# Patient Record
Sex: MALE | Race: WHITE | NOT HISPANIC OR LATINO | URBAN - METROPOLITAN AREA
[De-identification: names, ages, dates, MRNs, and addresses within clinical notes are randomized per-mention and may not be internally consistent; named-entity substitution may affect disease eponyms.]

---

## 2023-01-11 PROBLEM — K60.2 ANAL FISSURE: Status: ACTIVE | Noted: 2023-01-11

## 2023-01-11 PROBLEM — E55.9 VITAMIN D DEFICIENCY: Status: ACTIVE | Noted: 2023-01-11

## 2023-01-11 PROBLEM — K62.5 RECTAL BLEEDING: Status: ACTIVE | Noted: 2023-01-11

## 2023-01-13 PROBLEM — K21.9 GASTROESOPHAGEAL REFLUX DISEASE: Status: ACTIVE | Noted: 2017-02-10

## 2023-01-13 PROBLEM — R32 INCONTINENCE: Status: ACTIVE | Noted: 2017-02-10

## 2023-01-13 NOTE — TELEPHONE ENCOUNTER
Message  Received: 2 days ago  Yusra Sung NP  [facility] Clinical Staff  Vit D quite low. High dose replacement has been ordered to pharmacy on file. Please have patient take as instructed and repeat vit D level 1 week after last dose (ordered). TSH slightly high, should repeat in 4 weeks (ordered). Potassium and LFTs also slightly high, will repeat with TSH. Avoid alcohol, acetaminophen. CBC normal. Thanks.

## 2023-02-03 NOTE — TELEPHONE ENCOUNTER
From: Rubina Aviles  To: Adriana Ayala MD  Sent: 2/3/2023 10:53 AM CST  Subject: LTD Paperwork for Aleida Espinosa have some LTD paperwork I need you all to fill out for me. I plan to bring it by the office today Friday around 1pm serge. . I just need it to be signed. Thank you.

## 2023-02-15 NOTE — TELEPHONE ENCOUNTER
Patient reports vitamin D screening was not covered. Patient is new to the office and established care and only seen on 12/16/22.

## 2023-02-15 NOTE — TELEPHONE ENCOUNTER
Pt has been taking medication for anxiety and depression, and thinks that it's making him constipated. Only has a movement every four days, and is still bleeding. Not sure if it's connected but had a nosebleed. Wanted to know if this is normal still or if he has to do something else.      Please advise  Best callback number is [phone]

## 2023-02-15 NOTE — TELEPHONE ENCOUNTER
Spoke with patient about constipation concerns. Advised to take Mirlax daily and if not improved to try Milk of Magnesia. Reviewed pre-op check list for up coming procedure. Voiced understanding.

## 2023-02-15 NOTE — TELEPHONE ENCOUNTER
From: Burgess Smith  To: Tiffany Amador NP  Sent: 2/15/2023 1:14 PM CST  Subject: Vitamin D    With the Vitamin D being Deficient is there something something in my chart that needs to be entered to show that it was a needed test due to previously having issues with being deficient before?  So, insurance will cover that test as a Preventative Maintenance test.

## 2023-02-27 NOTE — TELEPHONE ENCOUNTER
Authorization [id number] for [id number], [id number], [address] [place] [address] surgery 3/3/2023

## 2023-02-27 NOTE — TELEPHONE ENCOUNTER
Attempted to call the patient. Left a voicemail informing the patient he can call the office to further discuss any unanswered questions. He is okay to take his new medications at this time.      Meli Carlson PA-C

## 2023-02-27 NOTE — TELEPHONE ENCOUNTER
[facility] tenative Prior Auth for surgery 3/3  [id number]    [facility]

## 2023-02-28 NOTE — PATIENT INSTRUCTIONS
1 Food allergies  See above clinical history. Diarrhea with eggs within minutes. Headaches with chicken and lettuce in the past.  History of migraines. See above skin testing to screen for an IgE mediated process  Recommend to avoid any foods that are positive on skin testing from an IgE perspective  Handouts on food allergies versus food intolerances provided and reviewed  If skin testing is negative and still possible food intolerance would recommend to avoid based upon symptoms    #2 diarrhea  Worse with eggs. Followed by GI. Currently being evaluated for diarrhea with blood in the stool. Seen on skin testing to screen for food allergic triggers. See above #1  #3 allergic rhinitis  See above skin testing to screen for environmental allergens  Reviewed avoidance measures and potential treatment option of immunotherapy    Consider trial of Xyzal, levocetirizine 5 mg once a day in place of Claritin or Allegra if they are not providing symptomatic relief  Begin Flonase or Nasonex 2 sprays per nostril once a day if having prominent nasal congestion or postnasal drip    #4 migraines  Worse after eating chicken. Reviewed foods that can trigger migraines from a migraine perspective but not necessarily allergic to the food. Advised to be watchful for meats humaira cheese sulfites processed foods  Motrin as needed.   Follow-up with PCP or neurology as scheduled

## 2023-03-01 NOTE — TELEPHONE ENCOUNTER
Pt wants to know how the wound will be wrapped and dressed post-procedure. His neighbor will be helping him and he just wants to know what to expect.      Please advise  Best callback number is [phone]

## 2023-03-01 NOTE — TELEPHONE ENCOUNTER
Received fax from [address] [place] [address] received for patient use of Aimovig   Approval granted: 1/30/23-2/29/24        Pt notified

## 2023-03-03 PROBLEM — K64.8 BLEEDING INTERNAL HEMORRHOIDS: Status: ACTIVE | Noted: 2023-03-03

## 2023-03-04 NOTE — BRIEF OP NOTE
Pre-Operative Diagnosis: Anal fissure [K60.2]     Post-Operative Diagnosis: Anal fissure [K60.2]      Procedure Performed:   ANAL EXAMINATION UNDER ANESTHESIA, LATERAL INTERNAL SPHINCTEROTOMY, HEMORRHOID BANDING    Surgeon(s) and Role:     * Elina Hoover MD - Primary    Assistant(s):        Surgical Findings: anal fissure and internal hemorrhoids.       Specimen: none     Estimated Blood Loss: Blood Output: 5 mL (3/3/2023  9:05 PM)      Dictation Number:  [id number]    Juliet Bullock MD  3/3/2023  9:10 PM

## 2023-03-04 NOTE — OPERATIVE REPORT
[facility]    PATIENT'S NAME: Benny oCok   ATTENDING PHYSICIAN: Tahir Maravilla M.D. OPERATING PHYSICIAN: Tahir Maravilla M.D. PATIENT ACCOUNT#:   [de-identified]    [facility] 10  MEDICAL RECORD #:   [id number]       YOB: 1990  ADMISSION DATE:       03/03/2023      OPERATION DATE:  03/03/2023    OPERATIVE REPORT      PREOPERATIVE DIAGNOSIS:    1. Anal fissure. 2.   Internal hemorrhoids. POSTOPERATIVE DIAGNOSIS:    1. Anal fissure. 2.   Internal hemorrhoids. PROCEDURE:    1. Anorectal examination under anesthesia. 2.   Subcutaneous lateral internal sphincterotomy. 3.   Rubber band ligation of internal hemorrhoids in 3 locations. ASSISTANT:  OR staff. ANESTHESIA:  General endotracheal anesthesia. ANESTHESIOLOGIST:  Duke Swain MD.    BLOOD LOSS:  Less than 5 mL. COMPLICATIONS:  None apparent. SPECIMENS:  None. DISPOSITION:  The patient is awakened from anesthesia and brought to the recovery room in stable condition. He tolerated the procedure without apparent complication. Needle, sponge, and instrument counts were correct at the end of the procedure. Preprocedure antibiotic and DVT prophylaxis administered per protocol and a time-out was performed prior to initiating the procedure identifying the correct patient, procedure, and surgeon. INDICATIONS:  Please review the preprocedural History and Physical.  Briefly, the patient is a 76-year-old male with rectal bleeding and rectal pain. Physical examination is consistent with anal fissure and internal hemorrhoids. Risks, benefits, and alternatives of surgery were explained to the patient in detail. Please refer to the preprocedural History and Physical for further information. FINDINGS:  The patient has an anal fissure that has shown no significant improvement since my evaluation in the office.   Patient also has 3 prominent internal hemorrhoids in standard locations. No other lesions in the anorectal canal within view of the anal retractor. OPERATIVE TECHNIQUE:  The patient is brought to the operating room and after induction of general endotracheal anesthesia, he is placed in a prone jackknife position. Buttocks taped and spread in usual manner. The perineum is then prepped and draped in usual sterile fashion. Visual inspection of the perineum reveals no external lesions of the anorectum nor of the perineum itself. Digital rectal examination reveals a slightly elevated rectal tone, no masses, no blood, palpable internal hemorrhoids. The prostate is within normal limits for age. Rectal retractor is then placed. Circumferential inspection of the anorectal canal confirms internal hemorrhoids in the right posterolateral, right anterolateral, and left lateral locations. Posterior midline anal fissure is also confirmed. No other lesions noted. Attention is initially turned to subcutaneous lateral internal sphincterotomy. Rectal retractor is placed under tension, and the groove between the internal and external sphincter is identified with a gloved finger in the anal canal.  An 11 blade is then placed in the intersphincteric groove and turned toward the examining finger. The internal sphincter is then released and divided. Digital pressure is held until hemostasis confirmed. Local anesthetic is injected in the area. Next, rubber band ligation is achieved separately to each individual internal hemorrhoid using an O-ring ligator. The hemorrhoid is grasped and using the O-ring ligator, a rubber band is placed at the base of the internal hemorrhoid. Once all 3 areas are treated, retractors are withdrawn. Hemostasis is confirmed. In the left lateral aspect, bleeding is encountered from the rubber band site and this is oversewn using 3-0 Vicryl suture. Next, Gelfoam, lidocaine jelly is placed in the anorectal canal.  All instruments are withdrawn.   Needle, sponge, and instrument counts are correct. The patient is then awakened from anesthesia and brought to the recovery room in stable condition. He tolerated the procedure without apparent complication. Needle, sponge, and instrument counts are correct at the end of procedure and operative findings are discussed with the patient's family immediately upon conclusion of surgery.     Dictated By Lanette Parra M.D.  d: 03/03/2023 21:18:05  t: 03/04/2023 03:53:04  Job [id number]  /

## 2023-03-04 NOTE — ANESTHESIA PROCEDURE NOTES
Airway  Date/Time: 3/3/2023 8:09 PM  Urgency: elective      General Information and Staff    Patient location during procedure: OR  Anesthesiologist: Nisha Bowie MD  Performed: anesthesiologist   Performed by: Nisha Bowie MD  Authorized by: Nisha Bowie MD      Indications and Patient Condition  Indications for airway management: anesthesia  Sedation level: deep  Preoxygenated: yes  Patient position: sniffing  Mask difficulty assessment: 0 - not attempted    Final Airway Details  Final airway type: endotracheal airway      Successful airway: ETT  Cuffed: yes   Successful intubation technique: direct laryngoscopy  Endotracheal tube insertion site: oral  Blade: Tho  Blade size: #4  ETT size (mm): 7.5    Cormack-Lehane Classification: grade I - full view of glottis  Placement verified by: chest auscultation and capnometry   Measured from: lips  ETT to lips (cm): 23  Number of attempts at approach: 1

## 2023-03-04 NOTE — ANESTHESIA POSTPROCEDURE EVALUATION
[address] Patient Status:  Hospital Outpatient Surgery   Age/Gender 28year old male MRN [id number]   [facility] SURGERY Attending Corine Palacio, Timmy Medina MD   Hosp Day # 0 PCP Suzie Rodriguez MD       Anesthesia Post-op Note    ANAL EXAMINATION UNDER ANESTHESIA, LATERAL INTERNAL SPHINCTEROTOMY, HEMORRHOID BANDING    Procedure Summary     Date: 03/03/23 Room / Location: [facility] [facility] OR 11 / [address] [facility] OR    Anesthesia Start: 2000 Anesthesia Stop: 2121    Procedure: ANAL EXAMINATION UNDER ANESTHESIA, LATERAL INTERNAL SPHINCTEROTOMY, HEMORRHOID BANDING (Anus) Diagnosis:       Anal fissure      (Anal fissure [K60.2])    Surgeons: Jah Mina MD Anesthesiologist: Carla Conteh MD    Anesthesia Type: general ASA Status: 2          Anesthesia Type: No value filed. Vitals Value Taken Time   /78 03/03/23 2120   Temp 97.4 03/03/23 2122   Pulse 98 03/03/23 2122   Resp 15 03/03/23 2122   SpO2 96 % 03/03/23 2122   Vitals shown include unvalidated device data. Patient Location: PACU    Anesthesia Type: general    Airway Patency: patent and extubated    Postop Pain Control: adequate    Mental Status: mildly sedated but able to meaningfully participate in the post-anesthesia evaluation    Nausea/Vomiting: none    Cardiopulmonary/Hydration status: stable euvolemic    Complications: no apparent anesthesia related complications    Postop vital signs: stable    Dental Exam: Unchanged from Preop    Patient to be discharged from PACU when criteria met.

## 2023-03-06 NOTE — TELEPHONE ENCOUNTER
Pt is feeling hot and has sharp 9/10 pain in chest, chest is red as well. Unsure about bleeding and fever.  Feeling nauseous, pt is wondering if it's okay to take a peptid    Please advise  Best callback number is [phone]

## 2023-03-06 NOTE — TELEPHONE ENCOUNTER
S/w pt. He states he feels better now. He states he got sharp chest pain, nauseated and sweaty about 45min ago after a walk outside. He states s/s have all resolved after drinking a bubly drink and walking. Reviewed if he gets chest pain that is not resolved he needs to go to ER. Reviewed anesthesia and post op. Reviewed with pt to drink water, walk and ok to take pepcid if needed.       Pt verbalized understanding

## 2023-03-08 NOTE — TELEPHONE ENCOUNTER
Pt states he has blood in stool. Pain 3 or 4/10 denies fever.      Please advise  Call back #[phone]

## 2023-03-22 NOTE — TELEPHONE ENCOUNTER
PA requested for Ubrelvy  Clinical questions answered and submitted to insurance  Awaiting coverage determination

## 2023-03-24 NOTE — TELEPHONE ENCOUNTER
Received fax from [address] received for patient use of Nidhi Chaztee   Approval granted: 2/22/23-3/23/24        Pt notified

## 2023-03-24 NOTE — TELEPHONE ENCOUNTER
Medication: Ubrelvy 50 mg & aimovig 70 mg     Date of last refill:02/23/2023 with 11 addt refills & 02/23/2023

## 2023-08-02 NOTE — TELEPHONE ENCOUNTER
Received fax from [facility] received for patient use of Aimovig   Approval granted: 8/2/23-2/2/24        Pt notified

## 2023-08-02 NOTE — TELEPHONE ENCOUNTER
Per pharmacy, PA required as insurance changed    PA requested for Aimovig  Clinical questions answered and submitted to insurance  Awaiting coverage determination

## 2023-10-02 NOTE — TELEPHONE ENCOUNTER
Received fax from [facility] received for patient use of Veria Harm   Approval granted: 10/2/23-10/2/24        Pt notified

## 2024-01-04 NOTE — TELEPHONE ENCOUNTER
Received fax from [facility]   Approval received for patient use of Aimovig   Approval granted: 2/3/24-2/3/25        Pt notified

## 2024-01-04 NOTE — TELEPHONE ENCOUNTER
PA requested for Aimovig  Clinical questions answered and submitted to insurance  Awaiting coverage determination

## 2024-02-01 NOTE — PATIENT INSTRUCTIONS
Apply over the counter hydrocortisone 0.5% cream to facial redness twice daily for 5 days.     Please complete blood work as ordered. Do blood work fasting- no food or drink except for plain water- for 8 hours prior to testing. Ideally, labs would be done early in the morning. You can Google [employer] for locations and scheduling

## 2024-02-01 NOTE — TELEPHONE ENCOUNTER
Please enter lab orders for the patient's upcoming physical appointment.     Pt would also like to have his Vit D checked   Physical scheduled:   Your appointments       Date & Time Appointment Department ([place])    Feb 01, 2024  2:00 PM CST Adult Physical with Bolivar Ram NP [facility]    PLEASE NOTE - Most insurances allow a Complete Physical once every 366 days. Please schedule accordingly.    Please arrive 15 minutes prior to your scheduled appointment. Please also bring your Insurance card, Photo ID, and your medication bottles or a list of your current medication.    If you no longer require this appointment, please contact your physician office to cancel.              [facility] [address]  [address]  [facility] [id number] [phone]           Preferred lab: [facility]     The patient has been notified to complete fasting labs prior to their physical appointment.   .

## 2024-02-01 NOTE — PROGRESS NOTES
Wong Garcia is a 34 year old male who presents for a complete physical exam.     HPI:   Pt has no acute complaints. Does have possible exposure to STD, would like testing. Denies fever/chills, dysuria, frequency, urgency, hematuria, abd/flank pain, N/V/D. Denies penile discharge, genital rash or lesions.     Has prior history of vitamin D deficiency. Has not been on supplements lately.     Has prior history of anxiety and depression, follows with both psychiatry and therapist for this.     Has prior history of migraines, following with neurology for management.     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 Is  with no children. Does not watch diet. Does not exercise routinely with but walks a lot. Reports is non-smoker. Drinks 0-2 alcoholic drinks per month.     Colonoscopy:  N/A    Wt Readings from Last 6 Encounters:   02/01/24 159 lb 12.8 oz (72.5 kg)   05/24/23 155 lb (70.3 kg)   03/03/23 155 lb (70.3 kg)   02/28/23 150 lb (68 kg)   02/23/23 150 lb (68 kg)   12/16/22 155 lb (70.3 kg)       AST (U/L)   Date Value   01/10/2023 46 (H)     ALT (U/L)   Date Value   01/10/2023 67 (H)      Current Outpatient Medications   Medication Sig Dispense Refill    AIMOVIG 70 MG/ML Subcutaneous INJECT 1ML INTO THE SKIN ONCE A MONTH      escitalopram 10 MG Oral Tab Take 1 tablet (10 mg total) by mouth daily. 45 tablet 0    ALPRAZolam ER 0.5 MG Oral Tablet 24 Hr Take 1 tablet (0.5 mg total) by mouth every morning. 30 tablet 1    QUEtiapine 25 MG Oral Tab Take 1 tablet (25 mg total) by mouth nightly. 30 tablet 0    senna-docusate 8.6-50 MG Oral Tab Take 1 tablet by mouth daily. 30 tablet 0    aspirin-acetaminophen-caffeine 250-250-65 MG Oral Tab Take 1 tablet by mouth every 6 (six) hours as needed for Pain.      acetaminophen 500 MG Oral Tab Take 2 tablets (1,000 mg total) by mouth every 6 (six) hours as needed for Pain.      docusate sodium 100 MG Oral Tab Take 1 tablet (100 mg total) by mouth daily.       butalbital-acetaminophen-caffeine -40 MG Oral Tab Take 1 tablet by mouth every 4 (four) hours as needed.      propranolol 40 MG Oral Tab Take 1 tablet (40 mg total) by mouth as needed.        Past Medical History:   Diagnosis Date    COVID-19 01/2022    fever, sweating, chills, body aches, sore throat, headache, loss of taste and smell    COVID-19 04/2021    fever, chills, sore throat, loss of taste and smell    Hx of motion sickness     Migraines     Visual impairment     glasses      Past Surgical History:   Procedure Laterality Date    REMOVAL OF ANAL FISSURE  03/2022      Family History   Adopted: Yes   Problem Relation Age of Onset    Depression Mother     Anxiety Mother     Other (egg allergy) Maternal Grandfather       Social History     Socioeconomic History    Marital status:    Tobacco Use    Smoking status: Never     Passive exposure: Never    Smokeless tobacco: Never   Vaping Use    Vaping Use: Never used   Substance and Sexual Activity    Alcohol use: Not Currently    Drug use: Not Currently   Other Topics Concern    Caffeine Concern Yes     Comment: 1 can soda, 1 cup coffee daily    Exercise No    Seat Belt Yes      Social History: as above     Health Maintenance  Immunizations: Recommend flu vaccine for 0072-1155 flu season.     Immunization History   Administered Date(s) Administered    Covid-19 Vaccine Pfizer 30 mcg/0.3 ml 04/08/2021, 04/29/2021    TDAP 02/10/2017    Tb Intradermal Test 09/21/2017       REVIEW OF SYSTEMS:   GENERAL: feels well otherwise. No fever, chills, malaise.  SKIN: denies any unusual skin lesions, rash or pruritis.  EYES: denies blurred/double vision, light sensitivity or visual disturbances.  HEENT: denies nasal congestion, sinus pain/tenderness. Denies neck stiffness.  LUNGS: denies dyspnea, wheezing, SOB, PACHECO, cough.  CARDIOVASCULAR: denies chest pain on exertion, palpitations, edema, orthopnea.  GI: denies abdominal pain, heartburn, diarrhea or  constipation.  : denies dysuria, frequency, urgency, hematuria, changes in stream or nocturia.   MUSCULOSKELETAL: denies back pain.  NEURO: denies headaches, dizziness, syncope.    EXAM:   /60   Pulse 64   Temp 96.8 °F (36 °C)   Resp 16   Ht 5' 6.54\" (1.69 m)   Wt 159 lb 12.8 oz (72.5 kg)   SpO2 99%   BMI 25.38 kg/m²   Body mass index is 25.38 kg/m².   GENERAL: well developed, well nourished,in no apparent distress  SKIN: Skin warm/dry. Color appropriate for ethnicity. No rashes, suspicious lesions.  HEENT: atraumatic, normocephalic. Bilateral TM clear w/o erythema or bulge  EYES: PERRLA, EOMI. Conjunctiva are clear. Sclera white  NECK: supple w/o masses/tenderness/ adenopathy, no bruits/JVD, Thyroid symmetrical w/o enlargement  LUNGS: clear to auscultation bilaterally, effort normal. Symmetrical expansion during respirations.  CARDIO: RRR without murmurs. No S3/S4.   GI: Soft, non-tender/non-distended, BS(+)x4, no masses, HSM or CVA tenderness.  MUSCULOSKELETAL: muscle strength grade 5 to all extremities, back non-tender.   EXTREMITIES: no edema, full ROM to all extremities. Patellar DTR 2+ bilaterally   NEURO: A/Ox3, cranial nerves II-XII intact, motor/sensory function grossly intact.     ASSESSMENT AND PLAN:     HEALTH MAINTENANCE:     Encounter Diagnoses   Name Primary?    Routine physical examination- adult male in his usual state of health. Discussed importance of getting routine exercise for at least 30 minutes daily and making healthy diet choices. Screening labs ordered as below. Yes    Possible exposure to STD- check STD panel.      Vitamin D deficiency- check Vitamin D level.      Anxiety and depression- management per psych and mental health providers.      Migraine with aura and without status migrainosus, not intractable- management per neurology.         Orders Placed This Encounter   Procedures    CBC [6399] [Q]    COMP METABOLIC PANEL [07736] [Q]    LIPID PANEL [7600] [Q]    TSH W  REFLEX TO FREE T4 [56460][Q]    VITAMIN D, 25-HYDROXY [68808][Q]    T Pallidum Screening [place]    HIV AG AB Combo (Consent Obtained prechecked)    Hepatitis B Surface Antibody    Hepatitis B Surface Antigen    HCV Antibody    Urine Chlamydia/GC Amplification       Meds & Refills for this Visit:  Requested Prescriptions      No prescriptions requested or ordered in this encounter       Imaging & Consults:  None    No follow-ups on file.  Patient Instructions                         Apply over the counter hydrocortisone 0.5% cream to facial redness twice daily for 5 days.     Please complete blood work as ordered. Do blood work fasting- no food or drink except for plain water- for 8 hours prior to testing. Ideally, labs would be done early in the morning. You can Google [employer] for locations and scheduling

## 2024-02-14 DIAGNOSIS — G43.719 CHRONIC MIGRAINE WITHOUT AURA, INTRACTABLE, WITHOUT STATUS MIGRAINOSUS: ICD-10-CM

## 2024-02-15 RX ORDER — UBROGEPANT 50 MG/1
TABLET ORAL
Qty: 16 TABLET | Refills: 0 | Status: SHIPPED | OUTPATIENT
Start: 2024-02-15 | End: 2024-04-19

## 2024-02-15 NOTE — TELEPHONE ENCOUNTER
Medication: Ubrelvy 50 mg     Date of last refill: 02/23/2023 with 2 addt refills  [place]      Last office visit: 05/24/2023  Due back to clinic per last office note:    Date next office visit scheduled:    Future Appointments   Date Time Provider Department Center   2/21/2024  4:40 PM Richar Alfonso PA [facility]   3/5/2024  5:00 PM [facility]   3/12/2024  5:00 PM [facility]   3/19/2024  5:00 PM [facility]   3/26/2024  5:00 PM [facility]   4/2/2024  5:00 PM [facility]   4/9/2024  5:00 PM [facility]   4/16/2024  5:00 PM [facility]   4/23/2024  5:00 PM [facility]   4/30/2024  5:00 PM [facility]   5/7/2024  5:00 PM [facility]   5/14/2024  5:00 PM [facility]           Last OV note recommendation:       Impression/ Plan:  Wong Garcia is a 33 year old male with PMHx including but not limited to anxiety, depression and panic attacks, as well as chronic migraines, who originally presented 2/23/2023 for evaluation and management of migraines.       Neurologic exam is normal and non-focal.  Patient has history of migraines for many years but had not been on any preventive medication for over a year.      Of note, patient has been on Aimovig injections in the past and done well in the past with this medication for prevention. He has significant anxiety and concern raised from polypharmacy and serotonin syndrome with concurrent use of triptan medications;        He is now on Aimovig 70 mg injection monthly and doing well.    In addition, he is doing well on abortive therapy with ubrogepantt (Ubrelvy)  and will continue.     Otherwise, patient was counseled regarding conservative management, stress reduction techniques, moist heat, massage, and OTC anti-inflammatory medications as needed; also discussed optimal timing of migraine specific medications for abortive therapy to maximize effectiveness, and risk of medication overuse headache.     Patient was additionally advised to keep a headache diary, detailing migraine triggers, alleviating factors, response to medication, as well as frequency/severity and type of headaches.      (G43.779) Chronic migraine without aura, intractable, without status migrainosus  (primary encounter diagnosis)  Plan: as noted above      (F41.1) MELODY (generalized anxiety disorder)  Plan: as noted above      (F32.A) Depression, unspecified depression type  Plan: as noted above     No follow-ups on file.        Price Curtis MD, Neurology  [facility]  Pager [phone]  5/24/2023

## 2024-02-20 NOTE — TELEPHONE ENCOUNTER
----- Message from Bolivar Ram NP sent at 2/19/2024 12:52 PM CST -----  TSH very high, should start medications for this, needs appointment to discussed, video would be OK. Vitamin D also low, can discuss replacement at visit as noted. Other labs good, no concerning or significant abnormalities. STD screening negative. Thanks.

## 2024-02-20 NOTE — TELEPHONE ENCOUNTER
Thyroid hormone is very high, and he should start medications for this, needs appointment to discussed, video OK

## 2024-02-28 NOTE — PROGRESS NOTES
Chief Complaint   Patient presents with    Lab Results     Review        HPI:  Presents for follow up of recent labs with significantly elevated TSH (17.19) and vitamin D deficiency (18). Is not currently on any thyroid medications. Denies taking vitamin B or biotin supplements. Reports is not on any vitamin D supplements either. Denies unintended weight changes, heat/cold intolerance, constipation.     Reports has long time issues with recurrent strep throat infections, requiring frequent antibiotic management, most recently on 2/8/24. Wondering if tonsillectomy is option for him.  Denies current sore throat, fever/chills.     Past Medical History:   Diagnosis Date    COVID-19 01/2022    fever, sweating, chills, body aches, sore throat, headache, loss of taste and smell    COVID-19 04/2021    fever, chills, sore throat, loss of taste and smell    Hx of motion sickness     Migraines     Visual impairment     glasses       Patient Active Problem List   Diagnosis    MELODY (generalized anxiety disorder)    Depression    Attention deficit hyperactivity disorder (ADHD), predominantly inattentive type    Anal fissure    Rectal bleeding    Vitamin D deficiency    Incontinence    Gastroesophageal reflux disease    Major depressive disorder, recurrent episode, moderate (HCC)    Bleeding internal hemorrhoids       Current Outpatient Medications   Medication Sig Dispense Refill    levothyroxine 25 MCG Oral Tab Take 1 tablet (25 mcg total) by mouth before breakfast. 90 tablet 0    ergocalciferol 1.25 MG (22115 UT) Oral Cap Take 1 capsule (50,000 Units total) by mouth once a week. 12 capsule 0    escitalopram 10 MG Oral Tab Take 1 tablet (10 mg total) by mouth daily. 90 tablet 0    ubrogepant (UBRELVY) 50 MG Oral Tab TAKE 1 TABLET BY MOUTH AT ONSET OF MIGRAINE. MAY TAKE ADDITIONAL TABLET IN 2 HOURS AS NEEDED. DO NOT EXCEED 2 TABLETS PER 24 HOUR PERIOD 16 tablet 0    AIMOVIG 70 MG/ML Subcutaneous INJECT 1ML INTO THE SKIN ONCE A  MONTH      ALPRAZolam ER 0.5 MG Oral Tablet 24 Hr Take 1 tablet (0.5 mg total) by mouth every morning. 30 tablet 1    QUEtiapine 25 MG Oral Tab Take 1 tablet (25 mg total) by mouth nightly. 30 tablet 0    senna-docusate 8.6-50 MG Oral Tab Take 1 tablet by mouth daily. 30 tablet 0    aspirin-acetaminophen-caffeine 250-250-65 MG Oral Tab Take 1 tablet by mouth every 6 (six) hours as needed for Pain.      acetaminophen 500 MG Oral Tab Take 2 tablets (1,000 mg total) by mouth every 6 (six) hours as needed for Pain.      docusate sodium 100 MG Oral Tab Take 1 tablet (100 mg total) by mouth daily.      butalbital-acetaminophen-caffeine -40 MG Oral Tab Take 1 tablet by mouth every 4 (four) hours as needed.      propranolol 40 MG Oral Tab Take 1 tablet (40 mg total) by mouth as needed.         Physical Exam  /62   Pulse 72   Temp 98.4 °F (36.9 °C)   Resp 18   Ht 5' 6.54\" (1.69 m)   Wt 160 lb 3.2 oz (72.7 kg)   SpO2 99%   BMI 25.44 kg/m²   Constitutional: well developed, well nourished, in no apparent distress  HEENT: Normocephalic and atraumatic. T  Eyes: Conjunctivae are pink and moist without exudate or drainage. EOMI.  Neck: Normal range of motion. Neck supple, thyroid not appreciably enlarged.   Lymphadenopathy: No cervical adenopathy.   Cardiovascular: Normal rate.   Pulmonary/Chest: No respiratory distress. Effort normal.  Skin: Skin is warm and dry. No rash noted. No erythema. No pallor.     A/P:    Encounter Diagnoses   Name Primary?    Elevated TSH- levothyroxine 25mcg. Medication administration, use and side effects discussed. Repeat TSH in 12 weeks with vitamin D as ordered. Will manage dose dependent on results.    Yes    Vitamin D deficiency- high dose vitamin D ordered. Medication administration and use discussed. Repeat vit D level in 12 weeks.        Recurrent streptococcal tonsillitis- referred to Dr. Velaqsuez for eval and discussion of possible tonsillectomy.       Total visit time was  31 minutes, including 26 minutes of face to face visit time and 5 minutes of documentation and chart review.       Orders Placed This Encounter   Procedures    VITAMIN D, 25-HYDROXY [56802][Q]    TSH W REFLEX TO FREE T4 [15420][Q]       Meds & Refills for this Visit:  Requested Prescriptions     Signed Prescriptions Disp Refills    levothyroxine 25 MCG Oral Tab 90 tablet 0     Sig: Take 1 tablet (25 mcg total) by mouth before breakfast.    ergocalciferol 1.25 MG (85065 UT) Oral Cap 12 capsule 0     Sig: Take 1 capsule (50,000 Units total) by mouth once a week.       Imaging & Consults:  ENT - INTERNAL    No follow-ups on file.  Patient Instructions                 Take levothyroxine, one tab, first thing in the morning on an empty stomach. Do not eat or drink, except for plain water, for 30-60 minutes after taking this medication. Do not take other medications for 30 minutes after taking this medication.     Take vitamin D high dose 50,000 units, once weekly for 3 months. After completing high dose vitamin D, start to take over the counter vitamin D3 supplement 1000 units daily, to maintain levels.     All questions were answered and the patient understands the plan.

## 2024-02-28 NOTE — PATIENT INSTRUCTIONS
Take levothyroxine, one tab, first thing in the morning on an empty stomach. Do not eat or drink, except for plain water, for 30-60 minutes after taking this medication. Do not take other medications for 30 minutes after taking this medication.     Take vitamin D high dose 50,000 units, once weekly for 3 months. After completing high dose vitamin D, start to take over the counter vitamin D3 supplement 1000 units daily, to maintain levels.   
normal/extra large/pink

## 2024-04-02 ENCOUNTER — APPOINTMENT (OUTPATIENT)
Dept: URBAN - METROPOLITAN AREA CLINIC 248 | Age: 34
Setting detail: DERMATOLOGY
End: 2024-04-02

## 2024-04-02 DIAGNOSIS — L21.8 OTHER SEBORRHEIC DERMATITIS: ICD-10-CM

## 2024-04-02 PROCEDURE — OTHER PRESCRIPTION: OTHER

## 2024-04-02 PROCEDURE — OTHER COUNSELING: OTHER

## 2024-04-02 PROCEDURE — OTHER PRESCRIPTION MEDICATION MANAGEMENT: OTHER

## 2024-04-02 PROCEDURE — 99203 OFFICE O/P NEW LOW 30 MIN: CPT

## 2024-04-02 RX ORDER — DESONIDE 0.5 MG/G
CREAM TOPICAL BID
Qty: 60 | Refills: 2 | Status: ERX | COMMUNITY
Start: 2024-04-02

## 2024-04-02 RX ORDER — KETOCONAZOLE 20 MG/ML
SHAMPOO, SUSPENSION TOPICAL
Qty: 120 | Refills: 3 | Status: ERX | COMMUNITY
Start: 2024-04-02

## 2024-04-02 ASSESSMENT — LOCATION SIMPLE DESCRIPTION DERM
LOCATION SIMPLE: RIGHT NOSE
LOCATION SIMPLE: POSTERIOR SCALP

## 2024-04-02 ASSESSMENT — LOCATION DETAILED DESCRIPTION DERM
LOCATION DETAILED: POSTERIOR MID-PARIETAL SCALP
LOCATION DETAILED: RIGHT NASAL SIDEWALL

## 2024-04-02 ASSESSMENT — LOCATION ZONE DERM
LOCATION ZONE: NOSE
LOCATION ZONE: SCALP

## 2024-04-02 NOTE — PROCEDURE: PRESCRIPTION MEDICATION MANAGEMENT
Render In Strict Bullet Format?: No
Detail Level: Zone
Initiate Treatment: ketoconazole 2 % shampoo \\nQuantity: 120.0 ml\\nSig: Wash scalp once per week, wash face daily\\n\\ndesonide 0.05 % topical cream BID\\nQuantity: 60.0 g\\nSig: Apply to aa on face QD M-F

## 2024-04-02 NOTE — HPI: SKIN LESION
What Type Of Note Output Would You Prefer (Optional)?: Bullet Format
How Severe Is Your Skin Lesion?: moderate
Has Your Skin Lesion Been Treated?: not been treated
Is This A New Presentation, Or A Follow-Up?: Skin Lesion
Breath sounds clear and equal bilaterally.

## 2024-04-18 DIAGNOSIS — G43.719 CHRONIC MIGRAINE WITHOUT AURA, INTRACTABLE, WITHOUT STATUS MIGRAINOSUS: ICD-10-CM

## 2024-04-19 RX ORDER — UBROGEPANT 50 MG/1
TABLET ORAL
Qty: 16 TABLET | Refills: 0 | Status: SHIPPED | OUTPATIENT
Start: 2024-04-19

## 2024-04-19 NOTE — TELEPHONE ENCOUNTER
Medication: ubrogepant (UBRELVY) 50 MG      Date of last refill: 2/15/24 (#16/0R)  [place]     Last office visit: 5/24/23  Due back to clinic per last office note:  no mention  Date next office visit scheduled:    Future Appointments   Date Time Provider Department Center   4/23/2024  5:00 PM [facility]   4/30/2024  5:00 PM [facility]   5/7/2024  5:00 PM [facility]   5/14/2024  5:00 PM [facility]   5/28/2024  6:40 PM Richar Alfonso PA [facility]           Last OV note recommendation:    Impression/ Plan:  Wong Garcia is a 33 year old male with PMHx including but not limited to anxiety, depression and panic attacks, as well as chronic migraines, who originally presented 2/23/2023 for evaluation and management of migraines.       Neurologic exam is normal and non-focal.  Patient has history of migraines for many years but had not been on any preventive medication for over a year.      Of note, patient has been on Aimovig injections in the past and done well in the past with this medication for prevention. He has significant anxiety and concern raised from polypharmacy and serotonin syndrome with concurrent use of triptan medications;        He is now on Aimovig 70 mg injection monthly and doing well.    In addition, he is doing well on abortive therapy with ubrogepantt (Ubrelvy) and will continue.     Otherwise, patient was counseled regarding conservative management, stress reduction techniques, moist heat, massage, and OTC anti-inflammatory medications as needed; also discussed optimal timing of migraine specific medications for abortive therapy to maximize effectiveness, and risk of medication overuse headache.     Patient was additionally advised to keep a headache diary, detailing migraine triggers, alleviating factors,  response to medication, as well as frequency/severity and type of headaches.      (G43.039) Chronic migraine without aura, intractable, without status migrainosus  (primary encounter diagnosis)  Plan: as noted above      (F41.1) MELODY (generalized anxiety disorder)  Plan: as noted above      (F32.A) Depression, unspecified depression type  Plan: as noted above

## 2024-05-02 ENCOUNTER — RX ONLY (RX ONLY)
Age: 34
End: 2024-05-02

## 2024-05-02 ENCOUNTER — APPOINTMENT (OUTPATIENT)
Dept: URBAN - METROPOLITAN AREA CLINIC 248 | Age: 34
Setting detail: DERMATOLOGY
End: 2024-05-03

## 2024-05-02 DIAGNOSIS — D49.2 NEOPLASM OF UNSPECIFIED BEHAVIOR OF BONE, SOFT TISSUE, AND SKIN: ICD-10-CM

## 2024-05-02 DIAGNOSIS — L82.1 OTHER SEBORRHEIC KERATOSIS: ICD-10-CM

## 2024-05-02 DIAGNOSIS — D18.0 HEMANGIOMA: ICD-10-CM

## 2024-05-02 DIAGNOSIS — D22 MELANOCYTIC NEVI: ICD-10-CM

## 2024-05-02 DIAGNOSIS — L81.4 OTHER MELANIN HYPERPIGMENTATION: ICD-10-CM

## 2024-05-02 DIAGNOSIS — L21.8 OTHER SEBORRHEIC DERMATITIS: ICD-10-CM

## 2024-05-02 PROBLEM — D22.5 MELANOCYTIC NEVI OF TRUNK: Status: ACTIVE | Noted: 2024-05-02

## 2024-05-02 PROBLEM — D18.01 HEMANGIOMA OF SKIN AND SUBCUTANEOUS TISSUE: Status: ACTIVE | Noted: 2024-05-02

## 2024-05-02 PROCEDURE — OTHER SHAVE REMOVAL: OTHER

## 2024-05-02 PROCEDURE — 11301 SHAVE SKIN LESION 0.6-1.0 CM: CPT

## 2024-05-02 PROCEDURE — OTHER COUNSELING: OTHER

## 2024-05-02 PROCEDURE — 99213 OFFICE O/P EST LOW 20 MIN: CPT | Mod: 25

## 2024-05-02 PROCEDURE — OTHER PRESCRIPTION: OTHER

## 2024-05-02 PROCEDURE — OTHER PRESCRIPTION MEDICATION MANAGEMENT: OTHER

## 2024-05-02 RX ORDER — ROFLUMILAST 3 MG/G
AEROSOL, FOAM TOPICAL
Qty: 60 | Refills: 1 | Status: CANCELLED | COMMUNITY
Start: 2024-05-02

## 2024-05-02 RX ORDER — ROFLUMILAST 3 MG/G
AEROSOL, FOAM TOPICAL
Qty: 60 | Refills: 1 | Status: ERX

## 2024-05-02 RX ORDER — FLUCONAZOLE 150 MG/1
TABLET ORAL
Qty: 6 | Refills: 0 | Status: ERX | COMMUNITY
Start: 2024-05-02

## 2024-05-02 ASSESSMENT — LOCATION DETAILED DESCRIPTION DERM
LOCATION DETAILED: PERIUMBILICAL SKIN
LOCATION DETAILED: RIGHT INFERIOR LATERAL UPPER BACK
LOCATION DETAILED: POSTERIOR MID-PARIETAL SCALP
LOCATION DETAILED: RIGHT RIB CAGE
LOCATION DETAILED: RIGHT NASAL SIDEWALL

## 2024-05-02 ASSESSMENT — LOCATION SIMPLE DESCRIPTION DERM
LOCATION SIMPLE: RIGHT UPPER BACK
LOCATION SIMPLE: ABDOMEN
LOCATION SIMPLE: RIGHT NOSE
LOCATION SIMPLE: POSTERIOR SCALP

## 2024-05-02 ASSESSMENT — LOCATION ZONE DERM
LOCATION ZONE: SCALP
LOCATION ZONE: NOSE
LOCATION ZONE: TRUNK

## 2024-05-02 NOTE — PROCEDURE: PRESCRIPTION MEDICATION MANAGEMENT
Discontinue Regimen: ketoconazole 2 % shampoo \\nQuantity: 120.0 ml\\nSig: Wash scalp once per week, wash face daily
Continue Regimen: desonide 0.05 % topical cream BID\\nQuantity: 60.0 g\\nSig: Apply to aa on face QD M-F.
Render In Strict Bullet Format?: No
Detail Level: Zone
Initiate Treatment: fluconazole 150 mg tablet \\nQuantity: 6.0 Tablet  Days Supply: 42\\nSig: Take one tablet every week for 6 weeks\\n\\nZoryve 0.3 % topical foam \\nQuantity: 60.0 g  Days Supply: 30\\nSig: Apply to AA 1-2x a day on face

## 2024-05-02 NOTE — PROCEDURE: SHAVE REMOVAL
Medical Necessity Information: It is in your best interest to select a reason for this procedure from the list below. All of these items fulfill various CMS LCD requirements except the new and changing color options.
Medical Necessity Clause: This procedure was medically necessary because the lesion that was treated was:
Lab: -[id number]
Lab Facility: 0
Detail Level: Detailed
Was A Bandage Applied: Yes
Size Of Lesion In Cm (Required): 0.7
Depth Of Shave: dermis
Biopsy Method: Dermablade
Anesthesia Type: 1% lidocaine with epinephrine
Hemostasis: Drysol
No
Wound Care: Petrolatum
Path Notes (To The Dermatopathologist): Check margins please
Render Path Notes In Note?: No
Consent was obtained from the patient. The risks and benefits to therapy were discussed in detail. Specifically, the risks of infection, scarring, bleeding, prolonged wound healing, incomplete removal, allergy to anesthesia, nerve injury and recurrence were addressed. Prior to the procedure, the treatment site was clearly identified and confirmed by the patient. All components of Universal Protocol/PAUSE Rule completed.
Post-Care Instructions: I reviewed with the patient in detail post-care instructions. Patient is to keep the biopsy site dry overnight, and then apply bacitracin twice daily until healed. Patient may apply hydrogen peroxide soaks to remove any crusting.
Notification Instructions: Patient will be notified of pathology results. However, patient instructed to call the office if not contacted within 2 weeks.
Billing Type: Third-Party Bill

## 2024-05-16 RX ORDER — ERGOCALCIFEROL 1.25 MG/1
50000 CAPSULE ORAL WEEKLY
Qty: 12 CAPSULE | Refills: 0 | OUTPATIENT
Start: 2024-05-16

## 2024-05-16 NOTE — TELEPHONE ENCOUNTER
Refill request for:    Requested Prescriptions     Pending Prescriptions Disp Refills    ERGOCALCIFEROL 1.25 MG (34199 UT) Oral Cap [Pharmacy Med Name: VITAMIN D2 50,000IU (ERGO) CAP RX] 12 capsule 0     Sig: TAKE 1 CAPSULE BY MOUTH ONCE A WEEK        Last Prescribed Quantity Refills   2/28/24 12 0     LOV 2/28/2024     Patient was asked to follow-up in: Follow-up not documented in note    Appointment scheduled: Visit date not found    Medication not on protocol.     # 12 with 0 refills routed to LUPILLO Obrien for review

## 2024-05-24 RX ORDER — LEVOTHYROXINE SODIUM 0.03 MG/1
25 TABLET ORAL
Qty: 90 TABLET | Refills: 0 | Status: SHIPPED | OUTPATIENT
Start: 2024-05-24

## 2024-05-24 NOTE — TELEPHONE ENCOUNTER
Requested Prescriptions     Pending Prescriptions Disp Refills    LEVOTHYROXINE 25 MCG Oral Tab [Pharmacy Med Name: LEVOTHYROXINE 0.025MG (25MCG) TAB] 90 tablet 0     Sig: TAKE 1 TABLET BY MOUTH EVERY DAY BEFORE BREAKFAST     LOV 2/28/2024     Patient was asked to follow-up in: Follow-up not documented in note    Appointment scheduled: Visit date not found     Medication refilled per protocol.

## 2024-07-03 DIAGNOSIS — R79.89 ELEVATED TSH: Primary | ICD-10-CM

## 2024-07-03 RX ORDER — LEVOTHYROXINE SODIUM 0.05 MG/1
50 TABLET ORAL
Qty: 90 TABLET | Refills: 0 | Status: SHIPPED | OUTPATIENT
Start: 2024-07-03

## 2024-08-15 ENCOUNTER — OFFICE VISIT (OUTPATIENT)
Facility: LOCATION | Age: 34
End: 2024-08-15
Payer: COMMERCIAL

## 2024-08-15 VITALS — OXYGEN SATURATION: 99 % | TEMPERATURE: 97 F | HEART RATE: 74 BPM

## 2024-08-15 DIAGNOSIS — K62.5 RECTAL BLEEDING: Primary | ICD-10-CM

## 2024-08-23 NOTE — PROGRESS NOTES
Follow Up Visit Note       Active Problems      1. Rectal bleeding          Chief Complaint   Chief Complaint   Patient presents with    [facility]      EP-I have a sore/tear and currently bleeding a good bit           History of Present Illness    Patient presents at the request of his primary care physician after recent evaluation in the emergency room.  Patient said prior surgical intervention for sphincterotomy from which she has recovered nicely.  Recently the patient experienced episode of bleeding and discomfort from the rectum and now he wishes follow-up evaluation.  No further episodes reported.    The patient denies fever, chills, chest pain, shortness of breath, dyspnea. The patient also denies hematemesis, melena, or hematochezia. The patient denies change in bowel or bladder habits. There is no complaint of hematuria or dysuria.    Allergies  Wong is allergic to eggs or egg-derived products.    Past Medical / Surgical / Social / Family History    The past medical and past surgical history have been reviewed by me today.    Past Medical History:    Anxiety    COVID-19    fever, sweating, chills, body aches, sore throat, headache, loss of taste and smell    COVID-19    fever, chills, sore throat, loss of taste and smell    Depression    Hx of motion sickness    Migraines    Visual impairment    glasses     Past Surgical History:   Procedure Laterality Date    Removal of anal fissure  03/2022       The family history and social history have been reviewed by me today.    Family History   Adopted: Yes   Problem Relation Age of Onset    Depression Mother     Anxiety Mother     Other (egg allergy) Maternal Grandfather      Social History     Socioeconomic History    Marital status:    Tobacco Use    Smoking status: Never     Passive exposure: Never    Smokeless tobacco: Never   Vaping Use    Vaping status: Never Used   Substance and Sexual Activity    Alcohol use: Not Currently    Drug use: Not  Currently   Other Topics Concern    Caffeine Concern Yes     Comment: 1 can soda, 1 cup coffee daily    Exercise No    Seat Belt Yes        Current Outpatient Medications:     ubrogepant (UBRELVY) 50 MG Oral Tab, TAKE 1 TABLET BY MOUTH AT ONSET OF MIGRAINE. MAY TAKE ADDITIONAL TABLET IN 2 HOURS AS NEEDED. DO NOT EXCEED 2 TABLETS PER 24 HOUR PERIOD, Disp: 16 tablet, Rfl: 0    ALPRAZOLAM ER 0.5 MG Oral Tablet 24 Hr, TAKE 1 TABLET(0.5 MG) BY MOUTH EVERY MORNING, Disp: 30 tablet, Rfl: 0    AIMOVIG 70 MG/ML Subcutaneous, INJECT 1ML INTO THE SKIN ONCE A MONTH, Disp: 1 mL, Rfl: 0    senna-docusate 8.6-50 MG Oral Tab, Take 1 tablet by mouth daily., Disp: 30 tablet, Rfl: 0    aspirin-acetaminophen-caffeine 250-250-65 MG Oral Tab, Take 1 tablet by mouth every 6 (six) hours as needed for Pain., Disp: , Rfl:     docusate sodium 100 MG Oral Tab, Take 1 tablet (100 mg total) by mouth daily., Disp: , Rfl:     butalbital-acetaminophen-caffeine -40 MG Oral Tab, Take 1 tablet by mouth every 4 (four) hours as needed., Disp: , Rfl:     escitalopram 10 MG Oral Tab, Take 1 tablet (10 mg total) by mouth daily., Disp: 90 tablet, Rfl: 0    QUEtiapine 25 MG Oral Tab, Take 1 tablet (25 mg total) by mouth nightly., Disp: 90 tablet, Rfl: 0    levothyroxine 50 MCG Oral Tab, Take 1 tablet (50 mcg total) by mouth before breakfast., Disp: 90 tablet, Rfl: 0    acetaminophen 500 MG Oral Tab, Take 2 tablets (1,000 mg total) by mouth every 6 (six) hours as needed for Pain., Disp: , Rfl:      Review of Systems  The Review of Systems has been reviewed by me during today.  Review of Systems     Physical Findings   Pulse 74   Temp 96.8 °F (36 °C)   SpO2 99%   Physical Exam  Constitutional:       Appearance: He is well-developed.   HENT:      Head: Normocephalic and atraumatic.   Eyes:      General: No scleral icterus.  Neck:      Trachea: No tracheal deviation.   Genitourinary:     Prostate: Not enlarged and not tender.      Rectum: No mass,  tenderness, anal fissure, external hemorrhoid or internal hemorrhoid. Normal anal tone.          Comments: No Prostate Nodule  Anal Sphincter Intact  No Pruritis Ani  No Lichenification  No Abscess  No Fistula in ano  No Anterior Fissure  No Posterior Fissure    See Procedures:  Anoscopy    Skin:     General: Skin is warm and dry.   Neurological:      Mental Status: He is alert and oriented to person, place, and time.   Psychiatric:         Speech: Speech normal.         Behavior: Behavior normal. Behavior is cooperative.         Thought Content: Thought content normal.         Judgment: Judgment normal.          Assessment   1. Rectal bleeding        Plan     The area of concern on the perianal region has resolved.  There is no active bleeding, infection, fluid collection, hematoma, or seroma now.  Dietary, activity, exercise and lifestyle recommendations were discussed.  The patient will follow-up with me on an as-needed basis.  The patient was provided ample opportunity to ask questions.  All of the patient's questions were answered in detail.  The patient voiced understanding of the care plan.     No orders of the defined types were placed in this encounter.      Imaging & Referrals   None    Follow Up  No follow-ups on file.    Glenroy Hoover MD

## 2024-09-17 ENCOUNTER — HOSPITAL ENCOUNTER (EMERGENCY)
Age: 34
Discharge: HOME | End: 2024-09-17
Attending: EMERGENCY MEDICINE
Payer: COMMERCIAL

## 2024-09-17 ENCOUNTER — OFFICE VISIT (OUTPATIENT)
Dept: NEUROLOGY | Facility: CLINIC | Age: 34
End: 2024-09-17
Payer: COMMERCIAL

## 2024-09-17 ENCOUNTER — TELEPHONE (OUTPATIENT)
Dept: FAMILY MEDICINE CLINIC | Facility: CLINIC | Age: 34
End: 2024-09-17

## 2024-09-17 ENCOUNTER — APPOINTMENT (OUTPATIENT)
Dept: ULTRASOUND IMAGING | Age: 34
End: 2024-09-17
Attending: PHYSICIAN ASSISTANT
Payer: COMMERCIAL

## 2024-09-17 ENCOUNTER — HOSPITAL ENCOUNTER (OUTPATIENT)
Age: 34
Discharge: OTHER INSTITUTION | End: 2024-09-17
Payer: COMMERCIAL

## 2024-09-17 VITALS
OXYGEN SATURATION: 97 % | DIASTOLIC BLOOD PRESSURE: 60 MMHG | TEMPERATURE: 97 F | HEART RATE: 68 BPM | SYSTOLIC BLOOD PRESSURE: 112 MMHG | RESPIRATION RATE: 20 BRPM

## 2024-09-17 VITALS
HEART RATE: 97 BPM | HEIGHT: 67 IN | TEMPERATURE: 98 F | RESPIRATION RATE: 16 BRPM | WEIGHT: 160 LBS | BODY MASS INDEX: 25.11 KG/M2 | SYSTOLIC BLOOD PRESSURE: 126 MMHG | DIASTOLIC BLOOD PRESSURE: 93 MMHG | OXYGEN SATURATION: 97 %

## 2024-09-17 VITALS
DIASTOLIC BLOOD PRESSURE: 71 MMHG | HEIGHT: 66 IN | HEART RATE: 73 BPM | SYSTOLIC BLOOD PRESSURE: 111 MMHG | WEIGHT: 160 LBS | BODY MASS INDEX: 25.71 KG/M2 | RESPIRATION RATE: 16 BRPM

## 2024-09-17 DIAGNOSIS — L03.116 LEFT LEG CELLULITIS: Primary | ICD-10-CM

## 2024-09-17 DIAGNOSIS — G43.909 EPISODIC MIGRAINE: Primary | ICD-10-CM

## 2024-09-17 DIAGNOSIS — M79.89 LEFT LEG SWELLING: Primary | ICD-10-CM

## 2024-09-17 DIAGNOSIS — G43.719 CHRONIC MIGRAINE WITHOUT AURA, INTRACTABLE, WITHOUT STATUS MIGRAINOSUS: ICD-10-CM

## 2024-09-17 PROCEDURE — 3008F BODY MASS INDEX DOCD: CPT | Performed by: OTHER

## 2024-09-17 PROCEDURE — 3074F SYST BP LT 130 MM HG: CPT | Performed by: OTHER

## 2024-09-17 PROCEDURE — 99205 OFFICE O/P NEW HI 60 MIN: CPT | Performed by: NURSE PRACTITIONER

## 2024-09-17 PROCEDURE — 99284 EMERGENCY DEPT VISIT MOD MDM: CPT

## 2024-09-17 PROCEDURE — 99213 OFFICE O/P EST LOW 20 MIN: CPT | Performed by: OTHER

## 2024-09-17 PROCEDURE — 3074F SYST BP LT 130 MM HG: CPT | Performed by: NURSE PRACTITIONER

## 2024-09-17 PROCEDURE — 3078F DIAST BP <80 MM HG: CPT | Performed by: OTHER

## 2024-09-17 PROCEDURE — 93971 EXTREMITY STUDY: CPT | Performed by: PHYSICIAN ASSISTANT

## 2024-09-17 PROCEDURE — 3078F DIAST BP <80 MM HG: CPT | Performed by: NURSE PRACTITIONER

## 2024-09-17 RX ORDER — ERENUMAB-AOOE 70 MG/ML
70 INJECTION SUBCUTANEOUS
Qty: 1 ML | Refills: 11 | Status: SHIPPED | OUTPATIENT
Start: 2024-09-17

## 2024-09-17 RX ORDER — UBROGEPANT 50 MG/1
TABLET ORAL
Qty: 16 TABLET | Refills: 5 | Status: SHIPPED | OUTPATIENT
Start: 2024-09-17

## 2024-09-17 RX ORDER — CEPHALEXIN 500 MG/1
500 CAPSULE ORAL 4 TIMES DAILY
Qty: 28 CAPSULE | Refills: 0 | Status: SHIPPED | OUTPATIENT
Start: 2024-09-17 | End: 2024-09-24

## 2024-09-17 RX ORDER — CEPHALEXIN 500 MG/1
500 CAPSULE ORAL ONCE
Status: COMPLETED | OUTPATIENT
Start: 2024-09-17 | End: 2024-09-17

## 2024-09-17 NOTE — PATIENT INSTRUCTIONS
Refill policies:    Allow 2-3 business days for refills; controlled substances may take longer.  Contact your pharmacy at least 5 days prior to running out of medication and have them send an electronic request or submit request through the “request refill” option in your [employer] account.  Refills are not addressed on weekends; covering physicians do not authorize routine medications on weekends.  No narcotics or controlled substances are refilled after noon on Fridays or by on call physicians.  By law, narcotics must be electronically prescribed.  A 30 day supply with no refills is the maximum allowed.  If your prescription is due for a refill, you may be due for a follow up appointment.  To best provide you care, patients receiving routine medications need to be seen at least once a year.  Patients receiving narcotic/controlled substance medications need to be seen at least once every 3 months.  In the event that your preferred pharmacy does not have the requested medication in stock (e.g. Backordered), it is your responsibility to find another pharmacy that has the requested medication available.  We will gladly send a new prescription to that pharmacy at your request.    Scheduling Tests:    If your physician has ordered radiology tests such as MRI or CT scans, please contact Central Scheduling at [phone] right away to schedule the test.  Once scheduled, the [facility] Centralized Referral Team will work with your insurance carrier to obtain pre-certification or prior authorization.  Depending on your insurance carrier, approval may take 3-10 days.  It is highly recommended patients assure they have received an authorization before having a test performed.  If test is done without insurance authorization, patient may be responsible for the entire amount billed.      Precertification and Prior Authorizations:  If your physician has recommended that you have a procedure or additional testing performed the [facility]  Centralized Referral Team will contact your insurance carrier to obtain pre-certification or prior authorization.    You are strongly encouraged to contact your insurance carrier to verify that your procedure/test has been approved and is a COVERED benefit.  Although the [facility] Centralized Referral Team does its due diligence, the insurance carrier gives the disclaimer that \"Although the procedure is authorized, this does not guarantee payment.\"    Ultimately the patient is responsible for payment.   Thank you for your understanding in this matter.  Paperwork Completion:  If you require FMLA or disability paperwork for your recovery, please make sure to either drop it off or have it faxed to our office at [phone]. Be sure the form has your name and date of birth on it.  The form will be faxed to our Forms Department and they will complete it for you.  There is a 25$ fee for all forms that need to be filled out.  Please be aware there is a 10-14 day turnaround time.  You will need to sign a release of information (YAS) form if your paperwork does not come with one.  You may call the Forms Department with any questions at [phone].  Their fax number is [phone].

## 2024-09-17 NOTE — ED INITIAL ASSESSMENT (HPI)
X1 wk Pt c/o left shin pain, warm to touch.  Pt states yenifer calfs were tight but felt better after a massage.

## 2024-09-17 NOTE — PROGRESS NOTES
[facility] Progress Note    HPI  Chief Complaint   Patient presents with    Migraine     Follow up and gets 3 a month and ubrelvy and aimovig are helping       As per my initial H&P from 2/23/2023,   \" Wong Garcia is a 33 year old, who presents for evaluation of migraines.      Patient has long standing history of headaches and has previously been followed by another neurologist (Dr. Levin, [facility] - notes reviewed) and been managed in the past on propranolol but has been on CGRP based therapy with Aimovig in the past as well, started 10/2020.      Patient also has significant history of anxiety and panic attacks and has been on multiple SSRI and SNRI medications, currently on duloxetine 90 mg daily. Despite being on this medication, he currently has migraine ~3-4 times weekly.          Patient notes variable presentation with pain in the back of the neck or behind the eyes and sometimes with light / sound sensitivity.  He admits he waits ~ 1 hr before he takes any medication and may take Excedrin migraine, with some improvement ~ 50% of the time.  He has used sumatriptan in the past but admits this made him sleepy when he would take it in the past.      He has not used sumatriptan since ~ early 2022.  He states his neurologist left his prior practice and he has not been on Aimovig since late 2021.  Prior to Aimovig, he was tried on propranolol but did not feel this helped much maybe ~20% but denied significant side effects.  When he was started on Aimovig, he only had migraines 1-2 times a month and denied any side effects when he was on this medication; additionally,he had improvement when he would take sumatriptan within 30 minutes of symptoms occurring; he does sometimes see \"squiggly lines\" in periphery of vision at times with his migraines.     Otherwise, patient denies any recent weight change, fevers, chills, nausea, double vision/ blurry vision / loss of vision, chest pain,  palpitations, shortness of breath, rashes, joint pains, bowel / bladder incontinence or mood issues. \"       Patient since last visit has been doing well; he has been on Aimovig 70 mg dose monthly.     He states migraines are overall controlled, having ~ 4 per month, but these respond to Ubrelvy; no side effects reported and happy with level of control.        Past Medical History:    Anxiety    COVID-19    fever, sweating, chills, body aches, sore throat, headache, loss of taste and smell    COVID-19    fever, chills, sore throat, loss of taste and smell    Depression    Hx of motion sickness    Migraines    Visual impairment    glasses     Past Surgical History:   Procedure Laterality Date    Removal of anal fissure  03/2022     Family History   Adopted: Yes   Problem Relation Age of Onset    Depression Mother     Anxiety Mother     Other (egg allergy) Maternal Grandfather      Social History     Socioeconomic History    Marital status:    Tobacco Use    Smoking status: Never     Passive exposure: Never    Smokeless tobacco: Never   Vaping Use    Vaping status: Never Used   Substance and Sexual Activity    Alcohol use: Yes     Comment: socially    Drug use: Never   Other Topics Concern    Caffeine Concern Yes     Comment: 1 tea 1 cup coffee daily    Exercise Yes     Comment: walks    Seat Belt Yes       Allergies   Allergen Reactions    Eggs Or Egg-Derived Products DIARRHEA         Current Outpatient Medications:     erenumab-aooe (AIMOVIG) 70 MG/ML Subcutaneous, Inject 1 mL (70 mg total) into the skin every 30 (thirty) days., Disp: 1 mL, Rfl: 11    ubrogepant (UBRELVY) 50 MG Oral Tab, TAKE 1 TABLET BY MOUTH AT ONSET OF MIGRAINE. MAY TAKE ADDITIONAL TABLET IN 2 HOURS AS NEEDED. DO NOT EXCEED 2 TABLETS PER 24 HOUR PERIOD, Disp: 16 tablet, Rfl: 5    escitalopram 10 MG Oral Tab, Take 1 tablet (10 mg total) by mouth daily., Disp: 90 tablet, Rfl: 0    QUEtiapine 25 MG Oral Tab, Take 1 tablet (25 mg total) by  mouth nightly., Disp: 90 tablet, Rfl: 0    ALPRAZOLAM ER 0.5 MG Oral Tablet 24 Hr, TAKE 1 TABLET(0.5 MG) BY MOUTH EVERY MORNING, Disp: 30 tablet, Rfl: 0    levothyroxine 50 MCG Oral Tab, Take 1 tablet (50 mcg total) by mouth before breakfast., Disp: 90 tablet, Rfl: 0    senna-docusate 8.6-50 MG Oral Tab, Take 1 tablet by mouth daily., Disp: 30 tablet, Rfl: 0    aspirin-acetaminophen-caffeine 250-250-65 MG Oral Tab, Take 1 tablet by mouth every 6 (six) hours as needed for Pain., Disp: , Rfl:     acetaminophen 500 MG Oral Tab, Take 2 tablets (1,000 mg total) by mouth every 6 (six) hours as needed for Pain., Disp: , Rfl:     docusate sodium 100 MG Oral Tab, Take 1 tablet (100 mg total) by mouth daily., Disp: , Rfl:     Review of Systems:  No chest pain or palpitations; otherwise as noted in HPI.    Exam:  /71 (BP Location: Left arm, Patient Position: Sitting, Cuff Size: adult)   Pulse 73   Resp 16   Ht 66\"   Wt 160 lb (72.6 kg)   BMI 25.82 kg/m²   Estimated body mass index is 25.82 kg/m² as calculated from the following:    Height as of this encounter: 66\".    Weight as of this encounter: 160 lb (72.6 kg).    Gen: well developed, well nourished, no acute distress  HEENT: normocephalic  Heart; normal S1/S2, regular rate and rhythm  Lungs: clear to auscultation bilaterally  Extremities: no edema, peripheral pulses intact    Neck: supple, full range of motion; no carotid bruits    Fundoscopic Exam: optic discs sharp bilaterally    Neuro:  Mental status:  Orientation: Alert and oriented to person, place, time  Speech Fluent and conversational    CN: PERRL, EOMI with no nystagmus, VFF, smile symmetric, sensation intact, tongue and palate midline, SCM intact, otherwise, CN 2-12 intact  Motor: 5/5 strength throughout, tone normal  DTR: 2+ symmetric throughout, toes downgoing bilaterally, no clonus  Sensory: intact to light touch throughout  Coord: FNF intact with no tremor or dysmetria; rapid alternating movements  intact bilaterally  Romberg: absent  Gait: normal casual, heel, toe and tandem gait    Labs:  None new      Imaging:  None new    Prior as noted below    From [facility]     MRI brain with and without contrast (9/24/2020):   FINDINGS:     There is no evidence for any acute areas of ischemia, infarct or intracranial hemorrhage.    The gray-white matter characteristics of the brain parenchyma are normal.    There are no abnormal intra-axial or extra-axial masses or fluid collections identified.    The ventricular system and basal cisterns are patent.    The flow voids of the base of the skull and intracranially are normal.    There are no abnormal areas of intracranial enhancement identified.    There is a small focus of susceptibility in the right lower medial cerebellum however this is in continuity with the vessel and could represent an incidental small developmental venous anomaly or old area of microscopic hemorrhage.  There is no corresponding finding seen on FLAIR or T2 in this particular region.  No other areas of abnormal susceptibility are seen.    IMPRESSION:     No acute findings seen.    Incidental small focus of susceptibility in the lower medial right cerebellum possibly a small developmental venous anomaly or old microscopic hemorrhage.  Tiny cavernoma in this region cannot be entirely excluded.  The rest of the exam is normal.    Please see above for a complete discussion.             Impression/ Plan:  Wong Carty is a 34 year old male with PMHx including but not limited to anxiety, depression and panic attacks, as well as chronic migraines, who originally presented 2/23/2023 for evaluation and management of migraines.       Neurologic exam is normal and non-focal.  Patient has history of migraines for many years but had not been on any preventive medication for over a year.      Of note, patient has been on Aimovig injections in the past and done well in the past with this medication for  prevention. He has significant anxiety and concern raised from polypharmacy and serotonin syndrome with concurrent use of triptan medications;       He is now on Aimovig 70 mg injection monthly and doing well.    In addition, he is doing well on abortive therapy with ubrogepantt (Ubrelvy) and will continue.     Otherwise, patient was counseled regarding conservative management, stress reduction techniques, moist heat, massage, and OTC anti-inflammatory medications as needed; also discussed optimal timing of migraine specific medications for abortive therapy to maximize effectiveness, and risk of medication overuse headache.     Patient was additionally advised to keep a headache diary, detailing migraine triggers, alleviating factors, response to medication, as well as frequency/severity and type of headaches.     1. Chronic migraine without aura, intractable, without status migrainosus  As noted above   - erenumab-aooe (AIMOVIG) 70 MG/ML Subcutaneous; Inject 1 mL (70 mg total) into the skin every 30 (thirty) days.  Dispense: 1 mL; Refill: 11    2. Episodic migraine  As noted above  - ubrogepant (UBRELVY) 50 MG Oral Tab; TAKE 1 TABLET BY MOUTH AT ONSET OF MIGRAINE. MAY TAKE ADDITIONAL TABLET IN 2 HOURS AS NEEDED. DO NOT EXCEED 2 TABLETS PER 24 HOUR PERIOD  Dispense: 16 tablet; Refill: 5    Return in about 1 year (around 9/17/2025).    Price Curtis MD, Neurology  [facility]  Pager [phone]  9/17/2024

## 2024-09-17 NOTE — TELEPHONE ENCOUNTER
Pt scheduled apt on my chart for 9/24 with following message    \"Leg pain/warmth/inflammation \"    Does he need to be seen sooner?

## 2024-09-17 NOTE — ED PROVIDER NOTES
Patient Seen in: [facility]      History     Chief Complaint   Patient presents with    Pain     Stated Complaint: front of left leg red and swollen and hot to touch    Subjective:   This a 34-year-old male with below stated medical history.  Presents to immediate care for left lower leg pain, warmth, and swelling.  Reports he has been experiencing pain and swelling in his left lower leg over the past week.  No trauma or injury.  No numbness or tingling.  Reports he was adjusted at the chiropractor with no relief.  Called his primary today and was told to be evaluated in the immediate care.  No history of blood clots.  No chest pain or shortness of breath no treatment attempted prior to arrival.     The history is provided by the patient.           Objective:   Past Medical History:    Anxiety    COVID-19    fever, sweating, chills, body aches, sore throat, headache, loss of taste and smell    COVID-19    fever, chills, sore throat, loss of taste and smell    Depression    Hx of motion sickness    Migraines    Visual impairment    glasses              Past Surgical History:   Procedure Laterality Date    Removal of anal fissure  03/2022                Social History     Socioeconomic History    Marital status:    Tobacco Use    Smoking status: Never     Passive exposure: Never    Smokeless tobacco: Never   Vaping Use    Vaping status: Never Used   Substance and Sexual Activity    Alcohol use: Yes     Comment: socially    Drug use: Never   Other Topics Concern    Caffeine Concern Yes     Comment: 1 tea 1 cup coffee daily    Exercise Yes     Comment: walks    Seat Belt Yes              Review of Systems   Constitutional:  Negative for chills and fever.   HENT:  Negative for congestion and sore throat.    Respiratory:  Negative for cough, shortness of breath and wheezing.    Cardiovascular:  Positive for leg swelling. Negative for chest pain and palpitations.   Gastrointestinal:  Negative for  abdominal pain.   Genitourinary:  Negative for dysuria.   Musculoskeletal:  Negative for back pain, neck pain and neck stiffness.   Skin:  Negative for rash.   Allergic/Immunologic: Negative for environmental allergies.   Neurological:  Negative for numbness and headaches.   Hematological:  Does not bruise/bleed easily.       Positive for stated Chief Complaint: Pain    Other systems are as noted in HPI.  Constitutional and vital signs reviewed.      All other systems reviewed and negative except as noted above.    Physical Exam     ED Triage Vitals [09/17/24 1844]   /60   Pulse 68   Resp 20   Temp 96.9 °F (36.1 °C)   Temp src Temporal   SpO2 97 %   O2 Device None (Room air)       Current Vitals:   Vital Signs  BP: 112/60  Pulse: 68  Resp: 20  Temp: 96.9 °F (36.1 °C)  Temp src: Temporal    Oxygen Therapy  SpO2: 97 %  O2 Device: None (Room air)            Physical Exam  Vitals and nursing note reviewed.   Constitutional:       General: He is not in acute distress.     Appearance: Normal appearance. He is not ill-appearing, toxic-appearing or diaphoretic.   HENT:      Head: Normocephalic and atraumatic.      Right Ear: External ear normal.      Left Ear: External ear normal.      Nose: Nose normal.      Mouth/Throat:      Mouth: Mucous membranes are moist.      Pharynx: Oropharynx is clear.   Eyes:      General:         Right eye: No discharge.         Left eye: No discharge.      Extraocular Movements: Extraocular movements intact.      Conjunctiva/sclera: Conjunctivae normal.   Cardiovascular:      Rate and Rhythm: Normal rate.   Pulmonary:      Effort: Pulmonary effort is normal.   Musculoskeletal:      Cervical back: Neck supple.      Right lower leg: No edema.      Left lower leg: No edema.   Skin:     Capillary Refill: Capillary refill takes less than 2 seconds.      Findings: No rash.   Neurological:      Mental Status: He is alert and oriented to person, place, and time.   Psychiatric:         Mood and  Affect: Mood normal.         Behavior: Behavior normal.               ED Course   Labs Reviewed - No data to display          DC to ED         MDM      Vital signs stable.  Patient is well-appearing and nontoxic looking.  Presents to immediate care for left lower leg pain, warmth, and swelling.    Patient reports a tightness sensation down the calf.  Left lower leg is more swollen compared to the right.    Patient will require ultrasound of the left lower extremity to rule out DVT.  We do not have this testing available at this clinic site.    Patient is agreeable to drive himself to the [facility] for evaluation.  Stable at discharge.                                    Medical Decision Making      Disposition and Plan     Clinical Impression:  1. Left leg swelling         Disposition:  Ic to ed  9/17/2024  6:52 pm    Follow-up:  No follow-up provider specified.        Medications Prescribed:  Discharge Medication List as of 9/17/2024  6:53 PM

## 2024-09-17 NOTE — TELEPHONE ENCOUNTER
Spoke to pt and he states that he has pain, warmth and swelling in left lower leg.    Advised that he go to [facility]. Cancelled appt for 9/24/*24. Pt agrees to go to Urgent Care.

## 2024-09-17 NOTE — DISCHARGE INSTRUCTIONS
Please go directly to the [facility] emergency department for evaluation of left lower leg pain and swelling.

## 2024-09-18 NOTE — DISCHARGE INSTRUCTIONS
Ultrasound is normal, no evidence of superficial or deep vein blood clot.  Take cephalexin 4 times a day for 7 days.  Tylenol or ibuprofen for pain.

## 2024-09-18 NOTE — ED INITIAL ASSESSMENT (HPI)
Patient here with left lower extremity swelling over the past week. States pain is through the front of the leg to the back. States it feels tight.

## 2024-09-18 NOTE — ED PROVIDER NOTES
Patient Seen in: [facility] [place]      History     Chief Complaint   Patient presents with    Swelling Edema     Stated Complaint: left leg swelling over the past week    Subjective:   HPI    Patient is a 34-year-old male presenting for evaluation of a week of left anterior shin left calf pain.  No trauma or injury of any kind.  Feels the pain primarily in the anterior proximal shin but it sort of wraps around to the calf and radiates down toward the ankle as well.  No radiation proximally.  No definite swelling.  No personal or family history of DVT.    Objective:   Past Medical History:    Anxiety    COVID-19    fever, sweating, chills, body aches, sore throat, headache, loss of taste and smell    COVID-19    fever, chills, sore throat, loss of taste and smell    Depression    Hx of motion sickness    Migraines    Visual impairment    glasses              Past Surgical History:   Procedure Laterality Date    Removal of anal fissure  03/2022                Social History     Socioeconomic History    Marital status:    Tobacco Use    Smoking status: Never     Passive exposure: Never    Smokeless tobacco: Never   Vaping Use    Vaping status: Never Used   Substance and Sexual Activity    Alcohol use: Yes     Comment: socially    Drug use: Never   Other Topics Concern    Caffeine Concern Yes     Comment: 1 tea 1 cup coffee daily    Exercise Yes     Comment: walks    Seat Belt Yes              Review of Systems    Positive for stated Chief Complaint: Swelling Edema    Other systems are as noted in HPI.  Constitutional and vital signs reviewed.      All other systems reviewed and negative except as noted above.    Physical Exam     ED Triage Vitals [09/17/24 1925]   /79   Pulse 68   Resp 18   Temp 98.1 °F (36.7 °C)   Temp src Oral   SpO2 98 %   O2 Device None (Room air)       Current Vitals:   Vital Signs  BP: 117/79  Pulse: 68  Resp: 18  Temp: 98.1 °F (36.7 °C)  Temp src:  Oral    Oxygen Therapy  SpO2: 98 %  O2 Device: None (Room air)            Physical Exam  Vitals and nursing note reviewed.   Constitutional:       Appearance: He is well-developed.   HENT:      Head: Normocephalic and atraumatic.   Eyes:      Conjunctiva/sclera: Conjunctivae normal.      Pupils: Pupils are equal, round, and reactive to light.   Cardiovascular:      Rate and Rhythm: Normal rate and regular rhythm.      Heart sounds: Normal heart sounds.   Pulmonary:      Effort: Pulmonary effort is normal.      Breath sounds: Normal breath sounds.   Abdominal:      General: Bowel sounds are normal.      Palpations: Abdomen is soft.   Musculoskeletal:         General: Normal range of motion.      Comments: .  Mild reproducible tenderness to the musculature of the left calf.  No significant edema.  Compartments are soft.  Distal pulses are good.  There is a little bit of very mild erythema over the anterior proximal shin.  No fluctuance or induration.  No evidence of drainable fluid collection.   Skin:     General: Skin is warm and dry.   Neurological:      Mental Status: He is alert and oriented to person, place, and time.               ED Course   Labs Reviewed - No data to display          US VENOUS DOPPLER LEG LEFT - DIAG IMG (CPT=93971)    Result Date: 9/17/2024  PROCEDURE:  US VENOUS DOPPLER LEG LEFT - DIAG IMG (CPT=93971)  COMPARISON:  None.  INDICATIONS:  Left lower extremity swelling  TECHNIQUE:  Real time, grey scale, and duplex ultrasound was used to evaluate the lower extremity venous system. B-mode two-dimensional images of the vascular structures, Doppler spectral analysis, and color flow.  Doppler imaging were performed.  The following veins were imaged:  Common, deep, and superficial femoral, popliteal, sapheno-femoral junction, posterior tibial veins, and the contralateral common femoral vein.  PATIENT STATED HISTORY: (As transcribed by Technologist)  Patient is having left lower leg pain and swelling.     FINDINGS:  EXTREMITY EXAMINED:  Left lower extremity SAPHENOFEMORAL JUNCTION:  No reflux. THROMBI:  None visible. COMPRESSION:  Normal compressibility, phasicity, and augmentation. OTHER:  Negative.            CONCLUSION:  No evidence of deep vein thrombus in the left lower extremity.   LOCATION:  [id number]    Dictated by (CST): Xiang River MD on 9/17/2024 at 9:16 PM     Finalized by (CST): Xiang River MD on 9/17/2024 at 9:17 PM               MDM      34-year-old male presenting for evaluation of pain in the left shin and calf as detailed above.  Awake and alert, no distress.  Differential includes cellulitis, DVT.  Ultrasound ordered.      Update at 9:30 PM.  Venous Dopplers negative for DVT.  Patient may have mild cellulitis of the shin I think it is reasonable to treat him with a return course of oral antibiotics at home.        Past Medical History- none    Differential diagnosis before testing included DVT, cellulitis    Co-morbidities that add to the complexity of management include: None    Testing ordered during this visit included venous Doppler    Radiographic images  I personally reviewed the radiographs and my individual interpretation shows no DVT  I also reviewed the official reports that showed no DVT            Disposition:          Discharge  I have discussed with the patient the results of test, differential diagnosis, treatment plan, warning signs and symptoms which should prompt immediate return.  They expressed understanding of these instructions and agrees to the following plan provided.  They were given written discharge instructions and agrees to return for any concerns and voiced understanding and all questions were answered.                           Medical Decision Making      Disposition and Plan     Clinical Impression:  1. Left leg cellulitis         Disposition:  Discharge  9/17/2024  9:35 pm    Follow-up:  Tino Gaston MD  [address]  [address]  [facility]  [id number]  [phone]    Follow up            Medications Prescribed:  Current Discharge Medication List        START taking these medications    Details   cephALEXin 500 MG Oral Cap Take 1 capsule (500 mg total) by mouth 4 (four) times daily for 7 days.  Qty: 28 capsule, Refills: 0

## 2024-10-12 NOTE — TELEPHONE ENCOUNTER
Requested Prescriptions     Pending Prescriptions Disp Refills    LEVOTHYROXINE 50 MCG Oral Tab [Pharmacy Med Name: LEVOTHYROXINE 0.05MG (50MCG) TAB] 90 tablet 0     Sig: TAKE 1 TABLET(50 MCG) BY MOUTH BEFORE BREAKFAST     LOV 2/28/2024     Patient was asked to follow-up in: Follow-up not documented in note    Appointment scheduled: Visit date not found     Medication not refilled per protocol.  Thyroid Medication Protocol Odjzvh29/12/2024 12:29 PM   Protocol Details Last TSH value is normal    TSH in past 12 months    In person appointment or virtual visit in the past 12 mos or appointment in next 3 mos

## 2024-10-14 RX ORDER — LEVOTHYROXINE SODIUM 50 UG/1
50 TABLET ORAL
Qty: 90 TABLET | Refills: 1 | Status: SHIPPED | OUTPATIENT
Start: 2024-10-14

## 2024-10-15 ENCOUNTER — TELEPHONE (OUTPATIENT)
Dept: NEUROLOGY | Facility: CLINIC | Age: 34
End: 2024-10-15

## 2024-10-15 NOTE — TELEPHONE ENCOUNTER
Received fax from [employer]   Approval received for patient use of Ubrelvy   Approval granted: 9/15/24-10/15/25        Pt notified

## 2024-10-28 ENCOUNTER — TELEPHONE (OUTPATIENT)
Facility: LOCATION | Age: 34
End: 2024-10-28

## 2024-10-28 NOTE — TELEPHONE ENCOUNTER
Patient called answering service today at 4:56 PM.  I returned call at number provided [phone] twice.  I left 2 messages.  No answer and call went to voicemail both times.  I did leave office number for patient.    MARILYN Pedro MD, FACS

## 2024-10-29 ENCOUNTER — PATIENT MESSAGE (OUTPATIENT)
Facility: LOCATION | Age: 34
End: 2024-10-29

## 2024-10-29 NOTE — TELEPHONE ENCOUNTER
Spoke with patient. Had a lot of bleeding with bowl movement 10/28. No bleeding with bowl movement 10/29. Patient had bleeding previously and patient states Dr. Hoover told him to come in soon after if it happened again so he could try to see where it was coming from. Appointment made for tomorrow 10/30.

## 2024-10-30 ENCOUNTER — OFFICE VISIT (OUTPATIENT)
Facility: LOCATION | Age: 34
End: 2024-10-30
Payer: COMMERCIAL

## 2024-10-30 VITALS
DIASTOLIC BLOOD PRESSURE: 79 MMHG | OXYGEN SATURATION: 97 % | SYSTOLIC BLOOD PRESSURE: 120 MMHG | RESPIRATION RATE: 20 BRPM | TEMPERATURE: 98 F | HEART RATE: 80 BPM

## 2024-10-30 DIAGNOSIS — K62.5 RECTAL BLEEDING: Primary | ICD-10-CM

## 2024-10-30 PROCEDURE — 3074F SYST BP LT 130 MM HG: CPT | Performed by: SURGERY

## 2024-10-30 PROCEDURE — 3078F DIAST BP <80 MM HG: CPT | Performed by: SURGERY

## 2024-10-30 PROCEDURE — 99243 OFF/OP CNSLTJ NEW/EST LOW 30: CPT | Performed by: SURGERY

## 2024-10-30 NOTE — PROGRESS NOTES
Follow Up Visit Note       Active Problems      1. Rectal bleeding          Chief Complaint   Chief Complaint   Patient presents with    [facility]     EP- Rectal Bleeding- states bleeding on and off, last time bleeding on 10/28         History of Present Illness    The patient presents with moderate volume recurrent painless rectal bleeding.  Recently the patient experienced an episode that was overall unprovoked this past Monday.  Patient's had prior rubber band ligations and sphincterotomy.  I proposed evaluation in the office with rubber band ligation.  The patient, however was intolerant of anoscopy therefore recommendation is for examination under anesthesia.    The patient denies fever, chills, chest pain, shortness of breath, dyspnea. The patient also denies hematemesis, melena. The patient denies change in bowel or bladder habits. There is no complaint of hematuria or dysuria.    I discussed the risk, benefits, alternatives of surgery.  I discussed the anticipated postoperative recovery including dietary, activity, exercise, and lifestyle recommendations.  The patient's questions regarding surgical procedure were answered and the patient voiced understanding of the care plan.    Allergies  Wong is allergic to eggs or egg-derived products.    Past Medical / Surgical / Social / Family History    The past medical and past surgical history have been reviewed by me today.    Past Medical History:    Anxiety    COVID-19    fever, sweating, chills, body aches, sore throat, headache, loss of taste and smell    COVID-19    fever, chills, sore throat, loss of taste and smell    Depression    Hx of motion sickness    Migraines    Visual impairment    glasses     Past Surgical History:   Procedure Laterality Date    Removal of anal fissure  03/2022       The family history and social history have been reviewed by me today.    Family History   Adopted: Yes   Problem Relation Age of Onset    Depression Mother      Anxiety Mother     Other (egg allergy) Maternal Grandfather      Social History     Socioeconomic History    Marital status:    Tobacco Use    Smoking status: Never     Passive exposure: Never    Smokeless tobacco: Never   Vaping Use    Vaping status: Never Used   Substance and Sexual Activity    Alcohol use: Yes     Comment: socially    Drug use: Never   Other Topics Concern    Caffeine Concern Yes     Comment: 1 tea 1 cup coffee daily    Exercise Yes     Comment: walks    Seat Belt Yes        Current Outpatient Medications:     escitalopram 10 MG Oral Tab, Take 1 tablet (10 mg total) by mouth daily., Disp: 90 tablet, Rfl: 0    ALPRAZOLAM ER 0.5 MG Oral Tablet 24 Hr, TAKE 1 TABLET(0.5 MG) BY MOUTH EVERY MORNING, Disp: 30 tablet, Rfl: 0    levothyroxine 50 MCG Oral Tab, Take 1 tablet (50 mcg total) by mouth before breakfast., Disp: 90 tablet, Rfl: 1    erenumab-aooe (AIMOVIG) 70 MG/ML Subcutaneous, Inject 1 mL (70 mg total) into the skin every 30 (thirty) days., Disp: 1 mL, Rfl: 11    ubrogepant (UBRELVY) 50 MG Oral Tab, TAKE 1 TABLET BY MOUTH AT ONSET OF MIGRAINE. MAY TAKE ADDITIONAL TABLET IN 2 HOURS AS NEEDED. DO NOT EXCEED 2 TABLETS PER 24 HOUR PERIOD (Patient not taking: Reported on 9/17/2024), Disp: 16 tablet, Rfl: 5    senna-docusate 8.6-50 MG Oral Tab, Take 1 tablet by mouth daily., Disp: 30 tablet, Rfl: 0    aspirin-acetaminophen-caffeine 250-250-65 MG Oral Tab, Take 1 tablet by mouth every 6 (six) hours as needed for Pain., Disp: , Rfl:     acetaminophen 500 MG Oral Tab, Take 2 tablets (1,000 mg total) by mouth every 6 (six) hours as needed for Pain., Disp: , Rfl:     docusate sodium 100 MG Oral Tab, Take 1 tablet (100 mg total) by mouth daily., Disp: , Rfl:      Review of Systems  The Review of Systems has been reviewed by me during today.  Review of Systems   Constitutional:  Negative for chills, diaphoresis, fatigue, fever and unexpected weight change.   HENT:  Negative for hearing loss,  nosebleeds, sore throat and trouble swallowing.    Respiratory:  Negative for apnea, cough, shortness of breath and wheezing.    Cardiovascular:  Negative for chest pain, palpitations and leg swelling.   Gastrointestinal:  Negative for abdominal distention, abdominal pain, anal bleeding, blood in stool, constipation, diarrhea, nausea and vomiting.   Genitourinary:  Negative for difficulty urinating, dysuria, frequency and urgency.   Musculoskeletal:  Negative for arthralgias and myalgias.   Skin:  Negative for color change and rash.   Neurological:  Negative for tremors, syncope and weakness.   Hematological:  Negative for adenopathy. Does not bruise/bleed easily.   Psychiatric/Behavioral:  Negative for behavioral problems and sleep disturbance.         Physical Findings   /79   Pulse 80   Temp 97.9 °F (36.6 °C) (Temporal)   Resp 20   SpO2 97%   Physical Exam  Exam conducted with a chaperone present.   Constitutional:       Appearance: He is well-developed.   HENT:      Head: Normocephalic and atraumatic.   Eyes:      General: No scleral icterus.  Neck:      Trachea: No tracheal deviation.   Genitourinary:     Rectum: No mass, tenderness, anal fissure or external hemorrhoid. Normal anal tone.      Comments:   Anal Sphincter Intact  No Pruritis Ani  No Lichenification  No Abscess  No Fistula in ano  No Anterior Fissure  No Posterior Fissure    See Procedures:  Anoscopy attempted but discontinued due to significant patient discomfort and intolerance.    Skin:     General: Skin is warm and dry.   Neurological:      Mental Status: He is alert and oriented to person, place, and time.   Psychiatric:         Speech: Speech normal.         Behavior: Behavior normal. Behavior is cooperative.         Thought Content: Thought content normal.         Judgment: Judgment normal.          Assessment   1. Rectal bleeding        Plan     The patient will be scheduled for anorectal examination under anesthesia and rubber  band ligation of internal hemorrhoids.    The huy-operative care plan was discussed with the patient, who voices understanding.  Activity and lifting recommendations were discussed in length.     The risks, benefits, and alternatives to the procedure were explained to the patient.  The risks explained include, but are not limited to, bleeding, infection, pain wound complications, recurrence, incorrect diagnosis, injury to adjacent organs and structures. We also discussed the possibile need for further therapeutic, diagnostic, or surgical intervention.  The patient voiced understanding, and after all questions were answered to the patient's satisfaction, the patient provided willing and informed consent to proceed.     No orders of the defined types were placed in this encounter.      Imaging & Referrals   None    Follow Up  No follow-ups on file.    Glenroy Hoover MD      Date of Surgery:     DX:     ICD-10-CM   1. Rectal bleeding  K62.5        Surgery Site :    [] Open  [] Laparoscopic  [] Robotic    [] Pilonidal Cystectomy  [] Excision of Lipomas     [] Sigmoidectomies    [] Hemorrhoidectomy   [] Transanal Hemorrhoidal Dearterialization [x] Rectal Exam Under Anesthesia with rubber band ligation of hemorrhoids  [] Ureteral stent, Urologist  [] Hernia   [] Ventral  [] Umbilical  [] Inguinal  [] Incisional  [] Paraesophageal [] Component Separation (TAR)   [] Cholecystectomy  [] Appendectomy    [] Port placement      Anesthesia: ANESTHESIA TYPE: Gen    Location:  [] [facility]   [] [facility] Out Pt Surgery  [] [facility] OR  [x] [facility]   [x] [facility]    Admission Status: EDW OR ADMIT LOCATION: [facility] Needed: Yes/No: No      Clearance Needed:  []Medical Clearance   []Cardiac Clearance:   []Anticoagulation Management:    []Renal:   []Neuro:         Pre-Admission Testing:  Surgeon's Personalized order Set.   Prophylactic Antibiotics Order: Prophylactic antibiotic protocol    Equipment:   [] C-Arm  Other:         Glenroy Hoover MD FACS  Trauma Medical Director, [facility] General Surgery    The 21st Century Cures Act makes medical notes like these available to patients in the interest of transparency. Please be advised this is a medical document. Medical documents are intended to carry relevant information, facts as evident, and the clinical opinion of the practitioner. The medical note is intended as peer to peer communication and may appear blunt or direct. It is written in medical language and may contain abbreviations or verbiage that are unfamiliar.    This note was prepared using Dragon Medical voice recognition dictation software. As a result, errors may occur. When identified, these errors have been corrected. While every attempt is made to correct errors during dictation, discrepancies may still exist.

## 2024-10-31 ENCOUNTER — TELEPHONE (OUTPATIENT)
Facility: LOCATION | Age: 34
End: 2024-10-31

## 2024-10-31 DIAGNOSIS — K62.5 RECTUM BLEEDING: Primary | ICD-10-CM

## 2024-12-09 ENCOUNTER — HOSPITAL ENCOUNTER (EMERGENCY)
Facility: HOSPITAL | Age: 34
Discharge: NURSING, REHAB OR LONG-TERM CARE | End: 2024-12-09
Attending: EMERGENCY MEDICINE
Payer: COMMERCIAL

## 2024-12-09 VITALS
WEIGHT: 155 LBS | SYSTOLIC BLOOD PRESSURE: 120 MMHG | HEIGHT: 67 IN | TEMPERATURE: 98 F | HEART RATE: 89 BPM | OXYGEN SATURATION: 95 % | BODY MASS INDEX: 24.33 KG/M2 | DIASTOLIC BLOOD PRESSURE: 90 MMHG | RESPIRATION RATE: 15 BRPM

## 2024-12-09 DIAGNOSIS — Z00.8 MEDICAL CLEARANCE FOR PSYCHIATRIC ADMISSION: Primary | ICD-10-CM

## 2024-12-09 PROBLEM — R45.851 SUICIDAL IDEATION: Status: ACTIVE | Noted: 2024-12-09

## 2024-12-09 PROBLEM — F33.3 MDD (MAJOR DEPRESSIVE DISORDER), RECURRENT, SEVERE, WITH PSYCHOSIS (HCC): Status: ACTIVE | Noted: 2024-12-09

## 2024-12-09 LAB
ALBUMIN SERPL-MCNC: 5 G/DL (ref 3.2–4.8)
ALBUMIN/GLOB SERPL: 1.5 {RATIO} (ref 1–2)
ALP LIVER SERPL-CCNC: 83 U/L
ALT SERPL-CCNC: 50 U/L
AMPHET UR QL SCN: NEGATIVE
ANION GAP SERPL CALC-SCNC: 10 MMOL/L (ref 0–18)
APAP SERPL-MCNC: <2 UG/ML (ref 10–20)
AST SERPL-CCNC: 31 U/L (ref ?–34)
BASOPHILS # BLD AUTO: 0.06 X10(3) UL (ref 0–0.2)
BASOPHILS NFR BLD AUTO: 0.7 %
BENZODIAZ UR QL SCN: NEGATIVE
BILIRUB SERPL-MCNC: 0.5 MG/DL (ref 0.3–1.2)
BUN BLD-MCNC: 11 MG/DL (ref 9–23)
CALCIUM BLD-MCNC: 10.3 MG/DL (ref 8.7–10.4)
CANNABINOIDS UR QL SCN: NEGATIVE
CHLORIDE SERPL-SCNC: 101 MMOL/L (ref 98–112)
CO2 SERPL-SCNC: 26 MMOL/L (ref 21–32)
COCAINE UR QL: NEGATIVE
CREAT BLD-MCNC: 1.01 MG/DL
CREAT UR-SCNC: 59 MG/DL
EGFRCR SERPLBLD CKD-EPI 2021: 99 ML/MIN/1.73M2 (ref 60–?)
EOSINOPHIL # BLD AUTO: 0.24 X10(3) UL (ref 0–0.7)
EOSINOPHIL NFR BLD AUTO: 2.7 %
ERYTHROCYTE [DISTWIDTH] IN BLOOD BY AUTOMATED COUNT: 12.1 %
ETHANOL SERPL-MCNC: <3 MG/DL (ref ?–3)
FENTANYL UR QL SCN: NEGATIVE
GLOBULIN PLAS-MCNC: 3.4 G/DL (ref 2–3.5)
GLUCOSE BLD-MCNC: 100 MG/DL (ref 70–99)
HCT VFR BLD AUTO: 43.3 %
HGB BLD-MCNC: 15.5 G/DL
IMM GRANULOCYTES # BLD AUTO: 0.03 X10(3) UL (ref 0–1)
IMM GRANULOCYTES NFR BLD: 0.3 %
LYMPHOCYTES # BLD AUTO: 2.49 X10(3) UL (ref 1–4)
LYMPHOCYTES NFR BLD AUTO: 27.5 %
MCH RBC QN AUTO: 29.2 PG (ref 26–34)
MCHC RBC AUTO-ENTMCNC: 35.8 G/DL (ref 31–37)
MCV RBC AUTO: 81.7 FL
MDMA UR QL SCN: NEGATIVE
MONOCYTES # BLD AUTO: 0.84 X10(3) UL (ref 0.1–1)
MONOCYTES NFR BLD AUTO: 9.3 %
NEUTROPHILS # BLD AUTO: 5.39 X10 (3) UL (ref 1.5–7.7)
NEUTROPHILS # BLD AUTO: 5.39 X10(3) UL (ref 1.5–7.7)
NEUTROPHILS NFR BLD AUTO: 59.5 %
OPIATES UR QL SCN: NEGATIVE
OSMOLALITY SERPL CALC.SUM OF ELEC: 283 MOSM/KG (ref 275–295)
OXYCODONE UR QL SCN: NEGATIVE
PLATELET # BLD AUTO: 207 10(3)UL (ref 150–450)
POTASSIUM SERPL-SCNC: 3.8 MMOL/L (ref 3.5–5.1)
PROT SERPL-MCNC: 8.4 G/DL (ref 5.7–8.2)
RBC # BLD AUTO: 5.3 X10(6)UL
SALICYLATES SERPL-MCNC: <3 MG/DL (ref 3–20)
SARS-COV-2 RNA RESP QL NAA+PROBE: NOT DETECTED
SODIUM SERPL-SCNC: 137 MMOL/L (ref 136–145)
WBC # BLD AUTO: 9.1 X10(3) UL (ref 4–11)

## 2024-12-09 PROCEDURE — 90792 PSYCH DIAG EVAL W/MED SRVCS: CPT

## 2024-12-09 RX ORDER — LORAZEPAM 1 MG/1
1 TABLET ORAL ONCE
Status: COMPLETED | OUTPATIENT
Start: 2024-12-09 | End: 2024-12-09

## 2024-12-09 RX ORDER — ESCITALOPRAM OXALATE 20 MG/1
10 TABLET ORAL EVERY MORNING
Status: DISCONTINUED | OUTPATIENT
Start: 2024-12-09 | End: 2024-12-09

## 2024-12-09 NOTE — BH PREADMISSION INTAKE NOTE
Preadmission Intake Note           Referral Source  Where was crisis eval performed?: On-site  Referral Source: Hospital  Referral Source Info: Brought to the ED with  and roommate  Hospital: [facility] Suicide Severity Rating Scale Screener   In what setting is the screener performed?: in person  1. Have you wished you were dead or wished you could go to sleep and not wake up? (past 30 days): Yes  2. Have you actually had any thoughts of killing yourself? (past 30 days): Yes  3. Have you been thinking about how you might kill yourself? (past 30 days): Yes  4. Have you had these thoughts and had some intention of acting on them? (past 30 days): Yes  5a. Have you started to work out or worked out the details of how to kill yourself? (past 30 days): No  5b. Do you intend to carry out this plan? (past 30 days): No  6. Have you ever done anything, started to do anything, or prepared to do anything to end your life? (lifetime): Yes  7. How long ago did you do any of these?: Within the last three months         Assessment Summary  Assessment Summary: see Level of Care Assessment     Level of Care Recommendations  Consulted with: Dr. Puga  Level of Care Recommendation: Inpatient Acute Care  Unit: B2  Reason for Unit Assigned: age and symptoms  Inpatient Criteria: Suicidal/homicidal risk;Severely decreased function  Behavioral Precautions: Suicide  Medical Precautions: None           Primary Psychiatric Diagnosis  Depressive Disorders: Major Depressive Disorder, Recurrent Episode  Depressive Disorder Recurrent Episode: with Psychotic Features            Pervasive Diagnoses  Neurodevelopmental Disorders: Deferred  Personality Disorders: Deferred  Pertinent Non-Psychiatric Diagnoses  Pertinent Non-psychiatric Diagnoses: GERD, Migraines, hemorrhoids                          Precaution Review  Behavioral Precautions: Suicide  Medical Precautions: None      Airborne Precautions TB Screening  1. Cough  (Current/Recent): No (go to Question 2) (none reported)  2. Fever (Current/Recent): No (go to Question 3) (none reported)  3. Night Sweats (Recent): No (go to Question 4) (none reported)  4. Weight Loss (Recent and Unexplained): No (none reported)  Subtotal- Resp. Symptoms: 0  No TB Screening Protocol Indicated: Screening Complete       Organism  Pt from acute care/rehab/nursing home : No  In the PAST YEAR, have you had an infection with: CDIFF, MRSA or other drug resistant organisms?:  (none reported)    Isolation Precautions  Isolation Precautions:  (n/a)  Animal Assisted Therapy Visits:  (JUDY pt not present, incoming transfer)  Animal Assisted Therapy Visits:  (JUDY pt not present, incoming transfer)      Current Medical  Medical Problems Under Current Treatment that will need to be continued after psychiatric admission: Migraines, GERD, Hemorrhoids  Current Medications: see MAR  Does the Patient Have: None  Active Eating Disorder: No
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
## 2024-12-09 NOTE — ED QUICK NOTES
Writer informed by PCT that patient had personal cell phone in room. Upon arrival to room, patient was found sitting on cart scrolling on cell phone. Writer informed patient that phones were against policy and would need to be collected and placed with belongings. Patient called his  to  phone and wedding ring from his finger. Patient was able to write down personal numbers on paper. Primary RN notified and  belongings picked up by  Richar

## 2024-12-09 NOTE — ED PROVIDER NOTES
The patient was observed in the department and remained calm and cooperative during their stay.  We are awaiting psychiatric placement at this time.

## 2024-12-09 NOTE — ED NOTES
This writer spoke with Richar Alfonso and Dr. Puga via phone who are both agreeable with inpatient admission.

## 2024-12-09 NOTE — ED QUICK NOTES
Rounding Completed    Plan of Care reviewed. Waiting for placement.  Elimination needs assessed.  Provided blanket.    Pt is sleeping/resting. Pt is in eyesight of sitter    Bed is locked and in lowest position. Call light within reach.

## 2024-12-09 NOTE — BH LEVEL OF CARE ASSESSMENT
Crisis Evaluation Assessment    Wong Carty YOB: 1990   Age 34 year old MRN [id number]   Formerly [facility] EMERGENCY DEPARTMENT Attending Dallas Kaye MD      Patient's legal sex: male  Patient identifies as: male  Patient's birth sex: male  Preferred pronouns: he/him    Date of Service: 12/9/2024    Referral Source:  Referral Source  Where was crisis eval performed?: Remote  Referral Source: Friend/Relative  Referral Source Info: Brought to the ED with  and roommate    Reason for Crisis Evaluation   Patient presents to the ED for psychiatric evaluation accompanied by  and roommate.     Patient said that he isn't exactly sure why he's in the hospital. He thought they ( and roommate) were going to go get dinner. He does remember early today that he and neighbor's dog were playing. He was helping the neighbor decorate the house while waiting for the roommate to get home from work. Patient fell asleep and the next he knows they are waking the patient up to bring him to the hospital. Patient was discharged from [facility] on Saturday for suicide. Patient again doesn't recall the events of what happened. He said, \"tonight they are saying I am manic\". Patient denies a formal bipolar diagnosis but has MELODY and depression. Patient is completing a group through [employer] and is under the care of Richar Alfonso for medication management. Patient is currently prescribed lexapro and xanax which he started to decrease and take as PRN himself. Once the season changed, he started to become more depressed (might have SAD). Patient is also seeing a therapist outside of [facility]. Patient last met with JADA in October.      reports that on Monday that he and the roommate had to take away a knife from the patient and call EMS. \"I heard him sharpening a knife from the other room. We were trying to talk him down. He told them, \"I just can't. I just want to make it look like an  accident. I want to drive off of [address]. I cannot fight anymore\". Initially,  thought he seemed better and was put on Risperdone but then he was quiet, paranoid and panicky.Today he said, \"this will be my last meal and I'm going to give you x amount of money on this gift card\". For an hour and half prior to arrival, patient was rapidly shifting between crying/depression and maura (labile mood). When the episode stopped, patient did not recall any of the events including going out to dinner. Per , he has a tendency to dissociate after intense episodes and today was very acute.\"It was the worst I have seen it\". He was asking them, \"why are you staring at me like that\". When asked about Risperdone, patient said that he didn't want to continue taking it because it gave him a migraine and he felt sleepy/groggy on it.     Patient reports several SI attempts in his history. From the ages of 5 until 18 he held a gun up to his head multiple times and hoped it would go off but it always had a lock on it. Patient wasn't sure if the gun was loaded during those times. No current access to guns. In high school, patient wanted to drive his car into a tree, into a gas station and/or into the lake. \"I am not happy with myself at all. I am tired of being who I am\". He endorses various stressors but will no specify. Patient endorses motivation to keep living for his dog,  and friends. He uses the dog to help with grounding techniques and tactile stimulation as well as breathing techniques.      is concerned about dissociation episodes. Patient had a bad panic attack in 2015 and was petitioned and released from an ED in [place] and following that incident, he dissociated for an extended period of time. He now will dissociate for at least 24 hours following intense panic, anxiety and/or stressors. Before the patient's  was able to redirect the patient but now is concerned for more impulsive behaviors  during those episodes.            Collateral  Richar Carty, , [phone]    Patient gave verbal consent for  and roommate to be present during the assessment.      states that he doesn't feel like he was ready to be discharged from [facility] but was still cleared by the psychiatrist.  and patient's friend would like the patient screened for Borderline personality. When the patient returned home he was very lethargic most of Saturday.  is concerned about safety and doesn't have the ability to keep someone with the patient for safety reasons.           Suicide Crisis Syndrome:  Suicide Crisis Syndrome  Do you feel trapped with no good options left?: Yes  Are you overwhelmed, or have you lost control by negative thoughts filling your head? : Yes    Suicide Risk Screening:  Source of information for CSSR: Patient;Collateral  In what setting is the screener performed?: in person  1. Have you wished you were dead or wished you could go to sleep and not wake up? (past 30 days): Yes  2. Have you actually had any thoughts of killing yourself? (past 30 days): Yes  3. Have you been thinking about how you might kill yourself? (past 30 days): Yes  4. Have you had these thoughts and had some intention of acting on them? (past 30 days): Yes  5a. Have you started to work out or worked out the details of how to kill yourself? (past 30 days): No  5b. Do you intend to carry out this plan? (past 30 days): No  6. Have you ever done anything, started to do anything, or prepared to do anything to end your life? (lifetime): Yes  7. How long ago did you do any of these?: Within the last three months  Score - BH OV: 10 - High Risk     Suicide Risk Assessments:  Suicidal Thoughts, Plan and Intent (this information to be used in conjunction with CSSR-S Suicide Screening)  Describe thoughts, ideation and intent:: Tonight, the patient was going out to eat with his  and roommate when he mentioned  something about the meal being his last meal and was talking about a gift card with so much money on it that he was going to give to his .  Frequency: How many times have you had these thoughts?: Other (comment) (Patient is having difficulty answering the questions because he doesn't recall this incident or the recent SI attempt on Monday)  Duration: When you have the thoughts, how long do they last?: Other (comment) (Patient is having difficulty answering the questions because he doesn't recall this incident or the recent SI attempt on Monday)  Controllability: Could/can you stop thinking about killing yourself or wanting to die if you want to?: Other (comment) (Patient is having difficulty answering the questions because he doesn't recall this incident or the recent SI attempt on Monday)  Identify Risk Factors  Do you have access to lethal methods to attempt suicide?: Yes  Describe access to lethal methods: household items  Clinical Status:: Mixed affect episode (e.g. Bipolar);Command Hallucinations to hurt self;Hopelessness;Highly impulsive behavior;Agitation or severe anxiety;History of Self-Injury  Activating Events/Recent Stressors:: Other (comment) (endorses a lot of stress but doesn't specify)  Identify Protective Factors  Internal: Identifies reasons for living  External: Responsibility to family or others, living with family;Supportive social network of family or friends  Risk Stratification  Risk Level: High       Patient tried to hurt himself with a knife on Monday and was admitted to [facility]. Patient was discharged yesterday and per /roommate they believe it was too early. Tonight, patient was making comments about tonight being his last meal and was discussing money that was left on his gift card. Patient does not remember the incident. Patient has hx of several SI attempts in his past. Patient also is having voices telling him to kill himself.             Non-Suicidal Self-Injury:    No current behaviors including triggers to SIB.     Patient has hx of SIB while in highschool including cutting his arms and legs mostly superficially. He notes an incident that was deeper on his arms and does have some scarring on his legs. Hx intent was to making it easier and/or take the pain away. No other types of SIB.           Risk to Others  HI - \"not really\"; denies hx of behaviors    Agitation/aggression - none currently           Access to Means:  Access to Means  Has access to means to attempt suicide, self-injure, harm others, or damage property?: Yes  Description of Access: household items  Discussion of Removal of Access to Means: Patient denies SI/HI  Access to Firearm/Weapon: No  Do you have a firearm owner identification (FOID) card?: No    Protective Factors:        Review of Psychiatric Systems:  Hallucinations - reports hearing different voices (some that sounds like people he knows) that will tell him, \"kill yourself, you're a burden, you need to stop, you're crazy\"; most of the time the voices are mumbled however they are more intrusive and intense. No other hallucinations.  confirms preoccupation and responding to internal stimuli.    Delusions - paranoia; reports having vivid dreams about things that did not actually happen;   \"People ping me into a corner and I get stuck in fight or flight mode\".     Romi - denies dx or hx of romi; per  the patient was demonstrating acute mood lability today with rapid shifting between crying and yelling.     Depression - worsening d/t SAD?; helpless/hopeless/worthless, \"tired of being in this state\".     Anxiety - worsening recently, panic symptoms, \"not enough time to do everything\".     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           Substance Use:  Reports that when he drinks it is socially (1-2 drinks) however he hasn't  drank in a while.     No hx of withdrawal symptoms. No drug use.                                                                                                   Functional Achievement:   ADL's - denies issues.           Ability to Care for Self::   Denies issues.           Current Treatment and Treatment History:  Dx - depression, anxiety     IP - recently discharged from [facility] yesterday     Medications - prescribed Lexapro and Xanax; reports that he wanted to take medications as PRN and that he might have SAD and should've started taking them regularly when the season changed; per both patient and  was taking Risperdone while IP but did not want to continue as he doesn't like taking medications.     Psychiatrist - JADA Abrams    IOP/PHP - In a virtual group through [employer]; 3rd party counseling           School/Work Performance:  Patient reports having a few jobs and will work about 60 hours per week.     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           Relevant Social History:  Family history of mental illness - biological sister (bipolar); birth mother (depression) maternal grandmother (depression, anxiety)     . No children. Living with  and roommate and is well supported.     No legal hx.           Cory and Complex (as applicable):                                    Current Medical (as applicable):      EDP Assessment (as applicable):  IBW Calculations  Weight: 155 lb  BMI (Calculated): 24.3  IBW LBS [place]: 148 LBS  IBW %: 104.73 %  IBW + 10%: 162.8 LBS  IBW - 10%: 133.2 LBS                                                                    Abuse Assessment:  Abuse Assessment  Does anyone say or do something to you that makes you feel unsafe?: No    Mental Status Exam:   General Appearance  Characteristics: Other (comment) (wearing hospital gown)  Eye Contact: Direct  Psychomotor Behavior  Gait/Movement: Other (comment) (JUDY remotely)  Abnormal movements:  None (JUDY remotely)  Posture: Other (comment) (JUDY remotely)  Rate of Movement: Other (comment) (JUDY remotely)  Mood and Affect  Mood or Feelings: Stressed;Depressed;Anxious  Anxiety Level- [facility] only: Moderate  Appropriateness of Affect: Congruent to mood  Range of Affect: Blunted  Stability of Affect: Stable  Attitude toward staff: Co-operative  Speech  Rate of Speech: Appropriate  Flow of Speech: Appropriate  Intensity of Volume: Ordinary  Clarity: Clear  Cognition  Concentration: Unimpaired  Memory: Recent memory impaired;Remote memory intact  Orientation Level: Oriented X4  Insight: Fair  Judgment: Poor  Thought Patterns  Clarity/Relevance: Coherent  Flow: Organized  Content: Hallucinations  Level of Consciousness: Alert  Type of Hallucination: Auditory  Level of Consciousness: Alert  Behavior  Exhibited behavior: Participated      Disposition:    Rationale for Treatment Recommendation:   Patient is a 34 year old male with hx of depression and anxiety who presents to the ED accompanied by  and roommate. Patient was discharged on Saturday from [facility].  believes it was too soon. Patient tried to hurt himself with a knife on Monday which his  and roommate needed to stop. Today, patient was telling them it would be his last meal and that he can have the money on this gift card. Patient has been dissociating and does not recall the events of what happened.  is concerned about the patient's safety, unable to redirect the dissociation. Patient has hx of SI attempts from young age until 18. Patient reports hearing voices that are more intrusive and impulsively telling him to kill himself or express other self-deprecating thoughts. Patient hasn't been adhering to prescribed medications and was using them PRN. Patient was started on Risperdone while at  but refused to continue it. Patient is currently in treatment. Consulted with Dr. Kaye.                Level of Care  Recommendations  Consulted with: Dr. Kaye  Level of Care Recommendation: Inpatient Acute Care  Unit: B2  Reason for Unit Assigned: age, sxs  Inpatient Criteria: Suicidal/homicidal risk;Severely decreased function  Behavioral Precautions: Suicide  Medical Precautions: None         Diagnoses with F-Codes:  Primary Psychiatric Diagnosis  F39 Mood Disorder Unspecified      Secondary Psychiatric Diagnoses  F41.1 Generalized Anxiety Disorder     Pervasive Diagnoses (as applicable)  Deferred     Pertinent Non-Psychiatric Diagnoses  Deferred         Chantell VYAS LCPC

## 2024-12-09 NOTE — ED NOTES
SBAR given to Bonoe SCANLON [phone] at 1100. Nurse to nurse information given to Shahida ED RN at 1115. Pt accepted to [facility] by Dr. Puga pt going to room 613A. ED RN instructed to send original copies of paperwork with patient upon transport and informed that Boone SCANLON will notify her of time to set up transport.

## 2024-12-09 NOTE — ED PROVIDER NOTES
Patient Seen in: [facility] Emergency Department      History     Chief Complaint   Patient presents with    Eval-P     Stated Complaint: SI, recent admission to [facility]    Subjective:   HPI      Patient is a 34-year-old gentleman presents ED for evaluation for concerns of suicidal ideation.  Patient just discharged yesterday from [facility] after being admitted for suicidal ideation.  Patient was at home with his partner was acting very labile crying and then manic at times.  Telling his partner that this was going to be his last meal.  He is not compliant with his medications.  He is also hearing voices that sometimes tell him to kill himself.  No HI.  No medical somatic complaints.  No other associated symptoms or complaints.    Objective:     Past Medical History:    Anxiety    COVID-19    fever, sweating, chills, body aches, sore throat, headache, loss of taste and smell    COVID-19    fever, chills, sore throat, loss of taste and smell    Depression    Hx of motion sickness    Migraines    Visual impairment    glasses              Past Surgical History:   Procedure Laterality Date    Removal of anal fissure  03/2022                Social History     Socioeconomic History    Marital status:    Tobacco Use    Smoking status: Never     Passive exposure: Never    Smokeless tobacco: Never   Vaping Use    Vaping status: Never Used   Substance and Sexual Activity    Alcohol use: Yes     Comment: socially    Drug use: Never   Other Topics Concern    Caffeine Concern Yes     Comment: 1 tea 1 cup coffee daily    Exercise Yes     Comment: walks    Seat Belt Yes                  Physical Exam     ED Triage Vitals [12/09/24 0027]   /80   Pulse 101   Resp 18   Temp 98 °F (36.7 °C)   Temp src Temporal   SpO2 96 %   O2 Device        Current Vitals:   Vital Signs  BP: 123/80  Pulse: 101  Resp: 18  Temp: 98 °F (36.7 °C)  Temp src: Temporal    Oxygen Therapy  SpO2: 96 %        Physical Exam  Vitals and nursing note  reviewed.   Constitutional:       General: He is not in acute distress.     Appearance: He is well-developed. He is not toxic-appearing.   HENT:      Head: Normocephalic and atraumatic.   Eyes:      General: No scleral icterus.     Conjunctiva/sclera: Conjunctivae normal.   Cardiovascular:      Rate and Rhythm: Normal rate.   Pulmonary:      Effort: Pulmonary effort is normal. No respiratory distress.   Abdominal:      General: There is no distension.   Musculoskeletal:         General: No tenderness. Normal range of motion.      Cervical back: Normal range of motion and neck supple.   Skin:     General: Skin is warm and dry.      Findings: No rash.   Neurological:      Mental Status: He is alert and oriented to person, place, and time.      Motor: No abnormal muscle tone.      Coordination: Coordination normal.   Psychiatric:         Behavior: Behavior normal.            ED Course     Labs Reviewed   COMP METABOLIC PANEL (14) - Abnormal; Notable for the following components:       Result Value    Glucose 100 (*)     ALT 50 (*)     Total Protein 8.4 (*)     Albumin 5.0 (*)     All other components within normal limits   ACETAMINOPHEN (TYLENOL), S - Abnormal; Notable for the following components:    Acetaminophen <2.0 (*)     All other components within normal limits   SALICYLATE, SERUM - Abnormal; Notable for the following components:    Salicylate <3.0 (*)     All other components within normal limits   ETHYL ALCOHOL - Normal   SARS-COV-2 BY PCR (GENEXPERT) - Normal   DRUG SCREEN 8 W/OUT CONFIRMATION, URINE    Narrative:     Results of the Urine Drug Screen should be used only for medical purposes.   CBC WITH DIFFERENTIAL WITH PLATELET                    MDM            -History source other than patient - partner        -Comorbidities did add complexity to the management are mentioned in the HPI above        -I personally reviewed the prior external notes and the medical record to obtain additional history reviewed  [facility]in immediate care note from November 20.  He presented with sore throat vomiting body aches.  Viral syndrome.        -DDX: Includes but not limited to suicidal ideation, bipolar disorder, maura which is a medical condition that can pose a threat to life/function    Labs Reviewed   COMP METABOLIC PANEL (14) - Abnormal; Notable for the following components:       Result Value    Glucose 100 (*)     ALT 50 (*)     Total Protein 8.4 (*)     Albumin 5.0 (*)     All other components within normal limits   ACETAMINOPHEN (TYLENOL), S - Abnormal; Notable for the following components:    Acetaminophen <2.0 (*)     All other components within normal limits   SALICYLATE, SERUM - Abnormal; Notable for the following components:    Salicylate <3.0 (*)     All other components within normal limits   ETHYL ALCOHOL - Normal   SARS-COV-2 BY PCR (GENEXPERT) - Normal   DRUG SCREEN 8 W/OUT CONFIRMATION, URINE    Narrative:     Results of the Urine Drug Screen should be used only for medical purposes.   CBC WITH DIFFERENTIAL WITH PLATELET     Cell labs reviewed, CMP is normal CBC normal urinalysis U tox COVID all negative aspirin Tylenol negative.  Patient medically cleared in the ED.  Discussed with  NESS Hollins, patient will be admitted               Medical Decision Making      Disposition and Plan     Clinical Impression:  1. Medical clearance for psychiatric admission         Disposition:  There is no disposition on file for this visit.  There is no disposition time on file for this visit.    Follow-up:  No follow-up provider specified.        Medications Prescribed:  Current Discharge Medication List              Supplementary Documentation:

## 2024-12-09 NOTE — ED QUICK NOTES
[facility] here. Documents given to medics, pt going to [facility]. Awake and alert. Calm and follows instructions

## 2024-12-31 ENCOUNTER — TELEPHONE (OUTPATIENT)
Facility: LOCATION | Age: 34
End: 2024-12-31

## 2024-12-31 NOTE — TELEPHONE ENCOUNTER
CHRIS ROJAS Patient  Member ID  [id number]    Date of Birth  1990-01-06    Gender  Male    Relationship to Subscriber  Spouse    Subscriber Name  TYE ROJAS    Transaction Type  Outpatient Authorization    Organization  [facility]    Payer  [facility] logo     Certificate Information  Reference Number  [id number]    Status  NO ACTION REQUIRED    Message  Requested Service does not require preauthorization. We would strongly encourage you to check benefits for this service.    Member Information  Patient Name  CHRIS ROJAS    Patient Date of Birth  1990-01-06    Patient Gender  Male    Member ID  [id number]    Relationship to Subscriber  Spouse    Subscriber Name  TYE ROJAS    Requesting Provider     Name  STEPHANIESKWAME    NPI  [id number]    Tax Id  [id number]    Specialty  [id number]    Provider Role  Provider    Address  [address] [place] [address]    Phone  [phone]    Fax  [phone]    Contact Name  JUDY ALONSO    Service Information  Service Type  2 - Surgical    Place of Service  22 - On [facility]    Service From - To Date  2025-01-15 - 2025-01-31    Level of Service  Elective    Diagnosis Code 1  K625 - Hemorrhage of anus and rectum    Procedure Code 1 (CPT/HCPCS)  33882 - SURG DX EXAM ANORECTAL    Quantity  1 Units    Status  NO ACTION REQUIRED    Procedure Code 2 (CPT/HCPCS)  25553 - LIGATION OF HEMORRHOID(S)    Quantity  1 Units    Status  NO ACTION REQUIRED

## 2025-01-02 ENCOUNTER — TELEPHONE (OUTPATIENT)
Dept: FAMILY MEDICINE CLINIC | Facility: CLINIC | Age: 35
End: 2025-01-02

## 2025-01-02 DIAGNOSIS — Z13.228 SCREENING FOR METABOLIC DISORDER: ICD-10-CM

## 2025-01-02 DIAGNOSIS — Z13.29 SCREENING FOR THYROID DISORDER: ICD-10-CM

## 2025-01-02 DIAGNOSIS — Z13.0 SCREENING FOR BLOOD DISEASE: Primary | ICD-10-CM

## 2025-01-02 DIAGNOSIS — Z13.220 SCREENING FOR LIPID DISORDERS: ICD-10-CM

## 2025-01-02 NOTE — TELEPHONE ENCOUNTER
Please enter lab orders for the patient's upcoming physical appointment.     Physical scheduled:   Your appointments       Date & Time Appointment Department ([facility])    Jan 08, 2025 9:30 AM CST Adult Physical with Bolivar Ram NP [facility] ([facility])    PLEASE NOTE - Most insurances allow a Complete Physical once every 366 days. Please schedule accordingly.    Please arrive 15 minutes prior to your scheduled appointment. Please also bring your Insurance card, Photo ID, and your medication bottles or a list of your current medication.    If you no longer require this appointment, please contact your physician office to cancel.              [facility]  [address] [place] [id number] [phone]           Preferred lab: [facility]     The patient has been notified to complete fasting labs prior to their physical appointment.

## 2025-01-07 PROBLEM — F33.2 MDD (MAJOR DEPRESSIVE DISORDER), RECURRENT SEVERE, WITHOUT PSYCHOSIS (HCC): Status: ACTIVE | Noted: 2025-01-07

## 2025-01-13 ENCOUNTER — TELEPHONE (OUTPATIENT)
Facility: LOCATION | Age: 35
End: 2025-01-13

## 2025-01-13 DIAGNOSIS — K62.5 RECTUM BLEEDING: Primary | ICD-10-CM

## 2025-01-28 PROBLEM — F60.3 BORDERLINE PERSONALITY DISORDER (HCC): Status: ACTIVE | Noted: 2025-01-28

## 2025-02-02 PROBLEM — F33.2 MDD (MAJOR DEPRESSIVE DISORDER), RECURRENT EPISODE, SEVERE (HCC): Status: ACTIVE | Noted: 2025-02-02

## 2025-02-09 PROBLEM — F41.1 GAD (GENERALIZED ANXIETY DISORDER): Status: ACTIVE | Noted: 2025-02-09

## 2025-03-01 DIAGNOSIS — G43.719 CHRONIC MIGRAINE WITHOUT AURA, INTRACTABLE, WITHOUT STATUS MIGRAINOSUS: ICD-10-CM

## 2025-03-03 ENCOUNTER — OFFICE VISIT (OUTPATIENT)
Dept: FAMILY MEDICINE CLINIC | Facility: CLINIC | Age: 35
End: 2025-03-03
Payer: COMMERCIAL

## 2025-03-03 VITALS
WEIGHT: 177 LBS | HEIGHT: 67 IN | DIASTOLIC BLOOD PRESSURE: 66 MMHG | OXYGEN SATURATION: 98 % | TEMPERATURE: 98 F | SYSTOLIC BLOOD PRESSURE: 118 MMHG | HEART RATE: 103 BPM | BODY MASS INDEX: 27.78 KG/M2 | RESPIRATION RATE: 18 BRPM

## 2025-03-03 DIAGNOSIS — K60.2 ANAL FISSURE: ICD-10-CM

## 2025-03-03 DIAGNOSIS — R05.2 SUBACUTE COUGH: ICD-10-CM

## 2025-03-03 DIAGNOSIS — F60.3 BORDERLINE PERSONALITY DISORDER (HCC): ICD-10-CM

## 2025-03-03 DIAGNOSIS — Z00.00 ROUTINE PHYSICAL EXAMINATION: Primary | ICD-10-CM

## 2025-03-03 DIAGNOSIS — E55.9 VITAMIN D DEFICIENCY: ICD-10-CM

## 2025-03-03 DIAGNOSIS — R79.89 ELEVATED TSH: ICD-10-CM

## 2025-03-03 DIAGNOSIS — R53.83 FATIGUE, UNSPECIFIED TYPE: ICD-10-CM

## 2025-03-03 DIAGNOSIS — K21.9 GASTROESOPHAGEAL REFLUX DISEASE, UNSPECIFIED WHETHER ESOPHAGITIS PRESENT: ICD-10-CM

## 2025-03-03 PROBLEM — F33.3 MDD (MAJOR DEPRESSIVE DISORDER), RECURRENT, SEVERE, WITH PSYCHOSIS (HCC): Status: RESOLVED | Noted: 2024-12-09 | Resolved: 2025-03-03

## 2025-03-03 PROBLEM — F33.2 MDD (MAJOR DEPRESSIVE DISORDER), RECURRENT SEVERE, WITHOUT PSYCHOSIS (HCC): Status: RESOLVED | Noted: 2025-01-07 | Resolved: 2025-03-03

## 2025-03-03 PROBLEM — R45.851 SUICIDAL IDEATION: Status: RESOLVED | Noted: 2024-12-09 | Resolved: 2025-03-03

## 2025-03-03 PROBLEM — F33.2 MDD (MAJOR DEPRESSIVE DISORDER), RECURRENT EPISODE, SEVERE (HCC): Status: RESOLVED | Noted: 2025-02-02 | Resolved: 2025-03-03

## 2025-03-03 RX ORDER — AZELASTINE 1 MG/ML
1 SPRAY, METERED NASAL 2 TIMES DAILY
Qty: 30 ML | Refills: 0 | Status: SHIPPED | OUTPATIENT
Start: 2025-03-03

## 2025-03-03 RX ORDER — ESCITALOPRAM OXALATE 10 MG/1
TABLET ORAL
COMMUNITY
Start: 2025-02-10

## 2025-03-03 RX ORDER — UBROGEPANT 50 MG/1
TABLET ORAL
Qty: 16 TABLET | Refills: 3 | Status: SHIPPED | OUTPATIENT
Start: 2025-03-03

## 2025-03-03 RX ORDER — LEVOTHYROXINE SODIUM 25 UG/1
25 TABLET ORAL
Qty: 90 TABLET | Refills: 0 | Status: SHIPPED | OUTPATIENT
Start: 2025-03-03 | End: 2025-03-11 | Stop reason: ALTCHOICE

## 2025-03-03 RX ORDER — OMEPRAZOLE 20 MG/1
20 CAPSULE, DELAYED RELEASE ORAL
Qty: 90 CAPSULE | Refills: 0 | Status: SHIPPED | OUTPATIENT
Start: 2025-03-03

## 2025-03-03 RX ORDER — KETOCONAZOLE 20 MG/ML
SHAMPOO, SUSPENSION TOPICAL
COMMUNITY
Start: 2024-12-08

## 2025-03-03 RX ORDER — DESONIDE 0.5 MG/G
CREAM TOPICAL
COMMUNITY
Start: 2024-12-08

## 2025-03-03 RX ORDER — ERENUMAB-AOOE 70 MG/ML
INJECTION SUBCUTANEOUS
Qty: 1 ML | Refills: 3 | Status: SHIPPED | OUTPATIENT
Start: 2025-03-03

## 2025-03-03 NOTE — PATIENT INSTRUCTIONS
Take omeprazole, one tab, first thing in the morning, on an empty stomach.     Use Azelastine, one spray each nostril, morning and evening.

## 2025-03-03 NOTE — PROGRESS NOTES
Wong Carty is a 35 year old male who presents for a complete physical exam.     HPI:   Pt complains of several week history of persistent cough, seems a little worse in the mornings. Notes some nasal drainage, mostly in colder temperature and occasional mild SOB. Denies fever/chills, sore throat, nasal/sinus congestion, ear pain/pressure, body aches. Does feel significant fatigue lately as well. Has not been treating with anything. Does have prior history of GERD, is not currently on any medications for this. Denies feeling heartburn sensation.     Was recently at inpatient management for mental health, had prior diagnosis of depression and anxiety. During inpatient stay, reports was diagnosed with borderline personality disorder and is following with Dr. Puga et al for management. Reports they stopped escitalopram for now and started him on lithium which he feels is working very well. Feels mood is more stable on this. Denies racing thoughts, mood swings, panic attacks or suicidal ideations.    Is following with Dr. Hoover for management of anal fissure. Scheduled for anesthesia assisted exam on 3/19.     Has prior diagnosis of abnormal TSH, managed with levothyroxine, which he is taking as ordered. Denies unintended weight changes, heat/cold intolerance, constipation.     Vitamin D deficiency- no recent levels on vitamin D.     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 Does not watch diet. Does not exercise routinely. Reports is non-smoker. Drinks 0 alcoholic drinks per week.     Colon cancer screening:  N/A    Wt Readings from Last 6 Encounters:   03/03/25 177 lb (80.3 kg)   12/08/24 155 lb (70.3 kg)   12/27/24 155 lb (70.3 kg)   09/17/24 160 lb (72.6 kg)   09/17/24 160 lb (72.6 kg)   02/28/24 160 lb 3.2 oz (72.7 kg)       CHOLESTEROL, TOTAL (mg/dL)   Date Value   02/17/2024 156     HDL CHOLESTEROL (mg/dL)   Date Value   02/17/2024 35 (L)     LDL-CHOLESTEROL (mg/dL (calc))   Date  Value   02/17/2024 88     AST (U/L)   Date Value   02/07/2025 31   01/19/2025 27   01/17/2025 36 (H)   02/17/2024 25   01/10/2023 46 (H)     ALT (U/L)   Date Value   02/07/2025 49   01/19/2025 49   01/17/2025 54 (H)   02/17/2024 38   01/10/2023 67 (H)      Current Outpatient Medications   Medication Sig Dispense Refill    AIMOVIG 70 MG/ML Subcutaneous ADMINISTER 1 ML UNDER THE SKIN 1 TIME A MONTH 1 mL 3    UBRELVY 50 MG Oral Tab TAKE 1 TABLET BY MOUTH AT ONSET OF MIGRAINE. MAY TAKE ADDITIONAL TABLET IN 2 HOURS AS NEEDED. DO NOT EXCEED 2 TABLETS PER 24 HOUR PERIOD 16 tablet 3    LEVOTHYROXINE 25 MCG Oral Tab TAKE 1 TABLET BY MOUTH EVERY DAY BEFORE BREAKFAST 90 tablet 0    PRAZOSIN 2 MG Oral Cap TAKE 1 CAPSULE(2 MG) BY MOUTH EVERY NIGHT 30 capsule 0    LITHIUM  MG Oral Tab CR TAKE 1 TABLET(300 MG) BY MOUTH AT BEDTIME 30 tablet 0    escitalopram 10 MG Oral Tab       desonide 0.05 % External Cream APPLY TO THE AFFECTED AREA FACE EVERY DAY MONDAY TO FRIDAY      ketoconazole 2 % External Shampoo       omeprazole 20 MG Oral Capsule Delayed Release Take 1 capsule (20 mg total) by mouth every morning before breakfast. 90 capsule 0    azelastine 0.1 % Nasal Solution 1 spray by Nasal route 2 (two) times daily. 30 mL 0    ergocalciferol 1.25 MG (35886 UT) Oral Cap Take 1 capsule (50,000 Units total) by mouth once a week. 8 capsule 0    ALPRAZolam 0.25 MG Oral Tab Take 1 tablet (0.25 mg total) by mouth in the morning, at noon, and at bedtime. 45 tablet 0    QUEtiapine 100 MG Oral Tab Take 1 tablet (100 mg total) by mouth nightly. (Patient taking differently: Take 0.5 tablets (50 mg total) by mouth nightly.) 30 tablet 0    traZODone 50 MG Oral Tab Take 1 tablet (50 mg total) by mouth nightly. 30 tablet 0    docusate sodium 100 MG Oral Cap Take 100 mg by mouth 2 (two) times daily as needed for constipation. 60 capsule 0    levothyroxine 50 MCG Oral Tab Take 1 tablet (50 mcg total) by mouth before breakfast.      melatonin 5  MG Oral Tab Take 1 tablet (5 mg total) by mouth at bedtime.        Past Medical History:    Allergic rhinitis    Anesthesia complication    takes longer to wake up-remains groggy long    Anxiety    Back problem    COVID-19    fever, sweating, chills, body aches, sore throat, headache, loss of taste and smell    COVID-19    fever, chills, sore throat, loss of taste and smell    Depression    Esophageal reflux    Hx of motion sickness    Migraines    Visual impairment    glasses      Past Surgical History:   Procedure Laterality Date    Colonoscopy      Removal of anal fissure  03/2022      Family History   Adopted: Yes   Problem Relation Age of Onset    Depression Mother     Anxiety Mother     Other (egg allergy) Maternal Grandfather       Social History     Socioeconomic History    Marital status:    Tobacco Use    Smoking status: Never     Passive exposure: Never    Smokeless tobacco: Never   Vaping Use    Vaping status: Never Used   Substance and Sexual Activity    Alcohol use: Not Currently     Comment: pt denies drinking    Drug use: Never   Other Topics Concern    Caffeine Concern Yes    Stress Concern Yes    Weight Concern Yes    Special Diet No    Exercise No    Seat Belt Yes     Social Drivers of Health     Food Insecurity: Food Insecurity Present (2/10/2025)    NCSS - Food Insecurity     Worried About Running Out of Food in the Last Year: Yes     Ran Out of Food in the Last Year: Yes   Transportation Needs: No Transportation Needs (2/10/2025)    NCSS - Transportation     Lack of Transportation: No   Housing Stability: Unknown (2/10/2025)    NCSS - Housing/Utilities     Has Housing: Yes     Worried About Losing Housing: Patient unable to answer     Unable to Get Utilities: No      Social History: as above     Health Maintenance  Immunizations: Recommend flu vaccine for 5174-2799 flu season.     Immunization History   Administered Date(s) Administered    Covid-19 Vaccine Pfizer 30 mcg/0.3 ml  04/08/2021, 04/29/2021    TDAP 02/10/2017    Tb Intradermal Test 09/21/2017         REVIEW OF SYSTEMS:   GENERAL: feels well otherwise. No fever, chills, malaise.  SKIN: denies any unusual skin lesions  EYES: denies blurred/double vision, light sensitivity or visual disturbances.  HEENT: denies nasal congestion, sinus pain/tenderness. Denies neck stiffness.  LUNGS: denies dyspnea, wheezing.  CARDIOVASCULAR: denies chest pain on exertion, palpitations, edema, orthopnea.  GI: denies abdominal pain, heartburn, diarrhea or constipation.  MUSCULOSKELETAL: denies back pain.  NEURO: denies headaches, dizziness, syncope.    EXAM:   /66   Pulse 103   Temp 97.8 °F (36.6 °C)   Resp 18   Ht 5' 7\" (1.702 m)   Wt 177 lb (80.3 kg)   SpO2 98%   BMI 27.72 kg/m²   Body mass index is 27.72 kg/m².   GENERAL: well developed, well nourished,in no apparent distress  SKIN: Skin warm/dry. Color appropriate for ethnicity. No rashes, suspicious lesions.  HEENT: atraumatic, normocephalic. Bilateral TM clear w/o erythema or bulge  EYES: PERRLA, EOMI. Conjunctiva are clear. Sclera white  NECK: supple w/o masses/tenderness/ adenopathy, no bruits/JVD, Thyroid symmetrical w/o enlargement  LUNGS: clear to auscultation bilaterally, effort normal. Symmetrical expansion during respirations.  CARDIO: RRR without murmurs. No S3/S4.   GI: Soft, non-tender/non-distended, BS(+)x4, no masses, HSM or CVA tenderness.  MUSCULOSKELETAL: muscle strength grade 5 to all extremities, back non-tender.   EXTREMITIES: no edema, full ROM to all extremities.   NEURO: A/Ox3, cranial nerves II-XII intact, motor/sensory function grossly intact.   PSYCH: Sitting calmly in exam room, interacting appropriately with examiner. Dressed appropriately for the weather.     ASSESSMENT AND PLAN:     HEALTH MAINTENANCE:     Encounter Diagnoses   Name Primary?    Routine physical examination- adult male with concerns as noted. Discussed appropriate health guidance including  importance of getting daily exercise and healthy diet choices. Recent labs reviewed in office today and additional screening labs ordered as below.   Yes    Subacute cough- suspect GERD. Start omeprazole and azelastine. See me in 2-3 weeks for follow up, sooner if worsening. Verbalized understanding of instructions and agreeable to this plan of care.        Gastroesophageal reflux disease, unspecified whether esophagitis present Start omeprazole. See me in 2-3 weeks for follow up, sooner if worsening. Verbalized understanding of instructions and agreeable to this plan of care.        Fatigue, unspecified type- check labs, may be related to mental health issues as well. See me for follow up as noted above. Verbalized understanding of instructions and agreeable to this plan of care.        Borderline personality disorder (HCC)- management per mental health team.        Anal fissure- management per Dr. Hoover.        Elevated TSH- check TSH, continue current management for now.        Vitamin D deficiency- check vitamin D level.         Orders Placed This Encounter   Procedures    TSH W REFLEX TO FREE T4 [54474][Q]    LIPID PANEL [7600] [Q]    TESTOSTERONE TOTAL [873] [Q]    VITAMIN D, 25-HYDROXY [81870][Q]       Meds & Refills for this Visit:  Requested Prescriptions     Signed Prescriptions Disp Refills    omeprazole 20 MG Oral Capsule Delayed Release 90 capsule 0     Sig: Take 1 capsule (20 mg total) by mouth every morning before breakfast.    azelastine 0.1 % Nasal Solution 30 mL 0     Si spray by Nasal route 2 (two) times daily.       Imaging & Consults:  SURGERY - INTERNAL    Return in about 3 weeks (around 3/24/2025) for Follow up. .  Patient Instructions           Take omeprazole, one tab, first thing in the morning, on an empty stomach.     Use Azelastine, one spray each nostril, morning and evening.

## 2025-03-03 NOTE — TELEPHONE ENCOUNTER
Requested Prescriptions     Pending Prescriptions Disp Refills    LEVOTHYROXINE 25 MCG Oral Tab [Pharmacy Med Name: LEVOTHYROXINE 0.025MG (25MCG) TAB] 90 tablet 0     Sig: TAKE 1 TABLET BY MOUTH EVERY DAY BEFORE BREAKFAST     LOV 02/28/2024     Patient was asked to follow-up in: Follow-up not documented in note    Appointment scheduled: 3/3/2025 Bolivar Ram NP     Medication refilled per protocol.

## 2025-03-03 NOTE — TELEPHONE ENCOUNTER
Medication: Aimovig 70mg & Ubrelvy 50mg     Date of last refill: 05/15/2024 & 09/17/2024  [place]      Last office visit: 09/17/2024  Due back to clinic per last office note:    Date next office visit scheduled:    Future Appointments   Date Time Provider Department Center   3/3/2025  4:00 PM Bolivar Ram, [facility]   3/4/2025  8:30 AM [facility] [address]   3/5/2025  8:30 AM [facility]ULT [facility] [address]   3/6/2025  8:30 AM [facility] [address]   3/7/2025  8:30 AM [facility] [address]   3/10/2025  8:30 AM [facility] - [facility] [address]   3/11/2025  8:30 AM ADULT [facility] - [facility] [address]   3/12/2025  8:30 AM [facility] - [facility] [address]   3/13/2025  8:30 AM [facility] - [facility] [address]   3/14/2025  8:30 AM [facility] [address]   3/17/2025  8:30 AM [facility] [address]   3/18/2025  8:30 AM [facility] [address]   3/20/2025  8:30 AM [facility] [address]   3/21/2025  8:30 AM ADULT [facility] - [facility] [address]   3/24/2025  8:30 AM AD[facility] - [facility] [address]   3/25/2025  8:30 AM [facility] [address]   3/26/2025  8:30 AM [facility] [address]   3/27/2025  8:30 AM [facility] [address]   3/28/2025  8:30 AM [facility] [address]   3/31/2025  8:30 AM [facility] [address]   4/1/2025  8:30 AM ADULT PSYCH IOP - [facility] [address]           Last OV note recommendation:         Impression/ Plan:  Wong Carty is a 34 year old male with PMHx including but not limited to anxiety, depression and panic attacks, as well as chronic migraines, who originally presented 2/23/2023 for evaluation and management of migraines.       Neurologic exam is normal and non-focal.  Patient has history of  migraines for many years but had not been on any preventive medication for over a year.      Of note, patient has been on Aimovig injections in the past and done well in the past with this medication for prevention. He has significant anxiety and concern raised from polypharmacy and serotonin syndrome with concurrent use of triptan medications;        He is now on Aimovig 70 mg injection monthly and doing well.    In addition, he is doing well on abortive therapy with ubrogepantt (Ubrelvy) and will continue.     Otherwise, patient was counseled regarding conservative management, stress reduction techniques, moist heat, massage, and OTC anti-inflammatory medications as needed; also discussed optimal timing of migraine specific medications for abortive therapy to maximize effectiveness, and risk of medication overuse headache.     Patient was additionally advised to keep a headache diary, detailing migraine triggers, alleviating factors, response to medication, as well as frequency/severity and type of headaches.      1. Chronic migraine without aura, intractable, without status migrainosus  As noted above   - erenumab-aooe (AIMOVIG) 70 MG/ML Subcutaneous; Inject 1 mL (70 mg total) into the skin every 30 (thirty) days.  Dispense: 1 mL; Refill: 11     2. Episodic migraine  As noted above  - ubrogepant (UBRELVY) 50 MG Oral Tab; TAKE 1 TABLET BY MOUTH AT ONSET OF MIGRAINE. MAY TAKE ADDITIONAL TABLET IN 2 HOURS AS NEEDED. DO NOT EXCEED 2 TABLETS PER 24 HOUR PERIOD  Dispense: 16 tablet; Refill: 5     Return in about 1 year (around 9/17/2025).

## 2025-03-04 LAB
ABSOLUTE BASOPHILS: 39 CELLS/UL (ref 0–200)
ABSOLUTE EOSINOPHILS: 213 CELLS/UL (ref 15–500)
ABSOLUTE LYMPHOCYTES: 1954 CELLS/UL (ref 850–3900)
ABSOLUTE MONOCYTES: 454 CELLS/UL (ref 200–950)
ABSOLUTE NEUTROPHILS: 2940 CELLS/UL (ref 1500–7800)
ALBUMIN/GLOBULIN RATIO: 1.7 (CALC) (ref 1–2.5)
ALBUMIN: 4.8 G/DL (ref 3.6–5.1)
ALKALINE PHOSPHATASE: 78 U/L (ref 36–130)
ALT: 39 U/L (ref 9–46)
AST: 28 U/L (ref 10–40)
BASOPHILS: 0.7 %
BILIRUBIN, TOTAL: 0.4 MG/DL (ref 0.2–1.2)
BUN: 11 MG/DL (ref 7–25)
CALCIUM: 9.5 MG/DL (ref 8.6–10.3)
CARBON DIOXIDE: 31 MMOL/L (ref 20–32)
CHLORIDE: 99 MMOL/L (ref 98–110)
CHOL/HDLC RATIO: 5.3 (CALC)
CHOLESTEROL, TOTAL: 213 MG/DL
CREATININE: 1.08 MG/DL (ref 0.6–1.26)
EGFR: 91 ML/MIN/1.73M2
EOSINOPHILS: 3.8 %
GLOBULIN: 2.9 G/DL (CALC) (ref 1.9–3.7)
GLUCOSE: 87 MG/DL (ref 65–99)
HDL CHOLESTEROL: 40 MG/DL
HEMATOCRIT: 42.6 % (ref 38.5–50)
HEMOGLOBIN: 14.4 G/DL (ref 13.2–17.1)
LDL-CHOLESTEROL: 136 MG/DL (CALC)
LYMPHOCYTES: 34.9 %
MCH: 29.9 PG (ref 27–33)
MCHC: 33.8 G/DL (ref 32–36)
MCV: 88.4 FL (ref 80–100)
MONOCYTES: 8.1 %
MPV: 11.5 FL (ref 7.5–12.5)
NEUTROPHILS: 52.5 %
NON-HDL CHOLESTEROL: 173 MG/DL (CALC)
PLATELET COUNT: 191 THOUSAND/UL (ref 140–400)
POTASSIUM: 4.3 MMOL/L (ref 3.5–5.3)
PROTEIN, TOTAL: 7.7 G/DL (ref 6.1–8.1)
RDW: 12.8 % (ref 11–15)
RED BLOOD CELL COUNT: 4.82 MILLION/UL (ref 4.2–5.8)
SODIUM: 137 MMOL/L (ref 135–146)
T4, FREE: 1.1 NG/DL (ref 0.8–1.8)
TRIGLYCERIDES: 229 MG/DL
TSH W/REFLEX TO FT4: 27.04 MIU/L (ref 0.4–4.5)
WHITE BLOOD CELL COUNT: 5.6 THOUSAND/UL (ref 3.8–10.8)

## 2025-03-06 ENCOUNTER — TELEPHONE (OUTPATIENT)
Dept: NEUROLOGY | Facility: CLINIC | Age: 35
End: 2025-03-06

## 2025-03-17 PROBLEM — F33.2 SEVERE RECURRENT MAJOR DEPRESSION WITHOUT PSYCHOTIC FEATURES (HCC): Status: ACTIVE | Noted: 2025-03-17

## 2025-03-19 ENCOUNTER — ANESTHESIA EVENT (OUTPATIENT)
Dept: SURGERY | Facility: HOSPITAL | Age: 35
End: 2025-03-19
Payer: COMMERCIAL

## 2025-03-19 ENCOUNTER — HOSPITAL ENCOUNTER (OUTPATIENT)
Facility: HOSPITAL | Age: 35
Setting detail: HOSPITAL OUTPATIENT SURGERY
Discharge: HOME | End: 2025-03-19
Attending: SURGERY | Admitting: SURGERY
Payer: COMMERCIAL

## 2025-03-19 ENCOUNTER — ANESTHESIA (OUTPATIENT)
Dept: SURGERY | Facility: HOSPITAL | Age: 35
End: 2025-03-19
Payer: COMMERCIAL

## 2025-03-19 VITALS
OXYGEN SATURATION: 97 % | HEART RATE: 71 BPM | WEIGHT: 175 LBS | TEMPERATURE: 98 F | BODY MASS INDEX: 27.47 KG/M2 | RESPIRATION RATE: 18 BRPM | HEIGHT: 67 IN | SYSTOLIC BLOOD PRESSURE: 96 MMHG | DIASTOLIC BLOOD PRESSURE: 74 MMHG

## 2025-03-19 PROCEDURE — 46221 LIGATION OF HEMORRHOID(S): CPT | Performed by: SURGERY

## 2025-03-19 PROCEDURE — 06LY7CC OCCLUSION OF HEMORRHOIDAL PLEXUS WITH EXTRALUMINAL DEVICE, VIA NATURAL OR ARTIFICIAL OPENING: ICD-10-PCS | Performed by: SURGERY

## 2025-03-19 RX ORDER — ONDANSETRON 2 MG/ML
INJECTION INTRAMUSCULAR; INTRAVENOUS AS NEEDED
Status: DISCONTINUED | OUTPATIENT
Start: 2025-03-19 | End: 2025-03-19 | Stop reason: SURG

## 2025-03-19 RX ORDER — METRONIDAZOLE 500 MG/100ML
500 INJECTION, SOLUTION INTRAVENOUS ONCE
Status: COMPLETED | OUTPATIENT
Start: 2025-03-19 | End: 2025-03-19

## 2025-03-19 RX ORDER — NALOXONE HYDROCHLORIDE 0.4 MG/ML
0.08 INJECTION, SOLUTION INTRAMUSCULAR; INTRAVENOUS; SUBCUTANEOUS AS NEEDED
Status: DISCONTINUED | OUTPATIENT
Start: 2025-03-19 | End: 2025-03-19

## 2025-03-19 RX ORDER — HEPARIN SODIUM 5000 [USP'U]/ML
INJECTION, SOLUTION INTRAVENOUS; SUBCUTANEOUS
Status: COMPLETED
Start: 2025-03-19 | End: 2025-03-19

## 2025-03-19 RX ORDER — SODIUM CHLORIDE, SODIUM LACTATE, POTASSIUM CHLORIDE, CALCIUM CHLORIDE 600; 310; 30; 20 MG/100ML; MG/100ML; MG/100ML; MG/100ML
INJECTION, SOLUTION INTRAVENOUS CONTINUOUS
Status: DISCONTINUED | OUTPATIENT
Start: 2025-03-19 | End: 2025-03-19

## 2025-03-19 RX ORDER — KETOROLAC TROMETHAMINE 30 MG/ML
INJECTION, SOLUTION INTRAMUSCULAR; INTRAVENOUS AS NEEDED
Status: DISCONTINUED | OUTPATIENT
Start: 2025-03-19 | End: 2025-03-19 | Stop reason: SURG

## 2025-03-19 RX ORDER — LIDOCAINE HYDROCHLORIDE 10 MG/ML
INJECTION, SOLUTION EPIDURAL; INFILTRATION; INTRACAUDAL; PERINEURAL AS NEEDED
Status: DISCONTINUED | OUTPATIENT
Start: 2025-03-19 | End: 2025-03-19 | Stop reason: SURG

## 2025-03-19 RX ORDER — METRONIDAZOLE 500 MG/100ML
INJECTION, SOLUTION INTRAVENOUS
Status: DISCONTINUED
Start: 2025-03-19 | End: 2025-03-19

## 2025-03-19 RX ORDER — LABETALOL HYDROCHLORIDE 5 MG/ML
5 INJECTION, SOLUTION INTRAVENOUS EVERY 5 MIN PRN
Status: DISCONTINUED | OUTPATIENT
Start: 2025-03-19 | End: 2025-03-19

## 2025-03-19 RX ORDER — HYDROMORPHONE HYDROCHLORIDE 1 MG/ML
0.4 INJECTION, SOLUTION INTRAMUSCULAR; INTRAVENOUS; SUBCUTANEOUS EVERY 5 MIN PRN
Status: DISCONTINUED | OUTPATIENT
Start: 2025-03-19 | End: 2025-03-19

## 2025-03-19 RX ORDER — PROCHLORPERAZINE EDISYLATE 5 MG/ML
5 INJECTION INTRAMUSCULAR; INTRAVENOUS EVERY 8 HOURS PRN
Status: DISCONTINUED | OUTPATIENT
Start: 2025-03-19 | End: 2025-03-19

## 2025-03-19 RX ORDER — SCOPOLAMINE 1 MG/3D
1 PATCH, EXTENDED RELEASE TRANSDERMAL ONCE
Status: DISCONTINUED | OUTPATIENT
Start: 2025-03-19 | End: 2025-03-19

## 2025-03-19 RX ORDER — MEPERIDINE HYDROCHLORIDE 25 MG/ML
INJECTION INTRAMUSCULAR; INTRAVENOUS; SUBCUTANEOUS
Status: COMPLETED
Start: 2025-03-19 | End: 2025-03-19

## 2025-03-19 RX ORDER — ACETAMINOPHEN 500 MG
1000 TABLET ORAL ONCE AS NEEDED
Status: DISCONTINUED | OUTPATIENT
Start: 2025-03-19 | End: 2025-03-19

## 2025-03-19 RX ORDER — HYDROMORPHONE HYDROCHLORIDE 1 MG/ML
INJECTION, SOLUTION INTRAMUSCULAR; INTRAVENOUS; SUBCUTANEOUS
Status: COMPLETED
Start: 2025-03-19 | End: 2025-03-19

## 2025-03-19 RX ORDER — HYDROMORPHONE HYDROCHLORIDE 1 MG/ML
0.6 INJECTION, SOLUTION INTRAMUSCULAR; INTRAVENOUS; SUBCUTANEOUS EVERY 5 MIN PRN
Status: DISCONTINUED | OUTPATIENT
Start: 2025-03-19 | End: 2025-03-19

## 2025-03-19 RX ORDER — HYDROCODONE BITARTRATE AND ACETAMINOPHEN 5; 325 MG/1; MG/1
1 TABLET ORAL ONCE AS NEEDED
Status: DISCONTINUED | OUTPATIENT
Start: 2025-03-19 | End: 2025-03-19

## 2025-03-19 RX ORDER — MEPERIDINE HYDROCHLORIDE 25 MG/ML
12.5 INJECTION INTRAMUSCULAR; INTRAVENOUS; SUBCUTANEOUS ONCE
Status: COMPLETED | OUTPATIENT
Start: 2025-03-19 | End: 2025-03-19

## 2025-03-19 RX ORDER — HEPARIN SODIUM 5000 [USP'U]/ML
5000 INJECTION, SOLUTION INTRAVENOUS; SUBCUTANEOUS ONCE
Status: COMPLETED | OUTPATIENT
Start: 2025-03-19 | End: 2025-03-19

## 2025-03-19 RX ORDER — HYDROMORPHONE HYDROCHLORIDE 1 MG/ML
0.2 INJECTION, SOLUTION INTRAMUSCULAR; INTRAVENOUS; SUBCUTANEOUS EVERY 5 MIN PRN
Status: DISCONTINUED | OUTPATIENT
Start: 2025-03-19 | End: 2025-03-19

## 2025-03-19 RX ORDER — HYDROCODONE BITARTRATE AND ACETAMINOPHEN 5; 325 MG/1; MG/1
2 TABLET ORAL ONCE AS NEEDED
Status: DISCONTINUED | OUTPATIENT
Start: 2025-03-19 | End: 2025-03-19

## 2025-03-19 RX ORDER — ROCURONIUM BROMIDE 10 MG/ML
INJECTION, SOLUTION INTRAVENOUS AS NEEDED
Status: DISCONTINUED | OUTPATIENT
Start: 2025-03-19 | End: 2025-03-19 | Stop reason: SURG

## 2025-03-19 RX ORDER — ONDANSETRON 2 MG/ML
4 INJECTION INTRAMUSCULAR; INTRAVENOUS EVERY 6 HOURS PRN
Status: DISCONTINUED | OUTPATIENT
Start: 2025-03-19 | End: 2025-03-19

## 2025-03-19 RX ORDER — ACETAMINOPHEN 500 MG
1000 TABLET ORAL ONCE
Status: DISCONTINUED | OUTPATIENT
Start: 2025-03-19 | End: 2025-03-19 | Stop reason: HOSPADM

## 2025-03-19 RX ORDER — DEXAMETHASONE SODIUM PHOSPHATE 4 MG/ML
VIAL (ML) INJECTION AS NEEDED
Status: DISCONTINUED | OUTPATIENT
Start: 2025-03-19 | End: 2025-03-19 | Stop reason: SURG

## 2025-03-19 RX ADMIN — ONDANSETRON 4 MG: 2 INJECTION INTRAMUSCULAR; INTRAVENOUS at 11:15:00

## 2025-03-19 RX ADMIN — LIDOCAINE HYDROCHLORIDE 5 ML: 10 INJECTION, SOLUTION EPIDURAL; INFILTRATION; INTRACAUDAL; PERINEURAL at 10:42:00

## 2025-03-19 RX ADMIN — METRONIDAZOLE 500 MG: 500 INJECTION, SOLUTION INTRAVENOUS at 10:46:00

## 2025-03-19 RX ADMIN — ROCURONIUM BROMIDE 50 MG: 10 INJECTION, SOLUTION INTRAVENOUS at 10:46:00

## 2025-03-19 RX ADMIN — DEXAMETHASONE SODIUM PHOSPHATE 4 MG: 4 MG/ML VIAL (ML) INJECTION at 10:46:00

## 2025-03-19 RX ADMIN — KETOROLAC TROMETHAMINE 30 MG: 30 INJECTION, SOLUTION INTRAMUSCULAR; INTRAVENOUS at 11:15:00

## 2025-03-19 NOTE — OPERATIVE REPORT
[facility]    PATIENT'S NAME: CHRIS ROJAS   ATTENDING PHYSICIAN: Glenroy Hoover M.D.   OPERATING PHYSICIAN: Glenroy Hoover M.D.   PATIENT ACCOUNT#:   [id number]    LOCATION:  [facility] 4 EDWP 10  MEDICAL RECORD #:   [id number]       YOB: 1990  ADMISSION DATE:       03/19/2025      OPERATION DATE:  03/19/2025    OPERATIVE REPORT      PREOPERATIVE DIAGNOSIS:  Painless rectal bleeding with possible internal hemorrhoids.  POSTOPERATIVE DIAGNOSIS:  Rectal bleeding with internal hemorrhoids.  PROCEDURE:    1.   Anorectal examination under anesthesia.   2.   Rubber band ligation of internal hemorrhoids, 3 locations.    ASSISTANT:  Joann Kurtz PA-C    ANESTHESIA:  General endotracheal anesthesia.    ANESTHESIOLOGIST:  Jeb Sarah MD    BLOOD LOSS:  Less than 2 mL.    COMPLICATIONS:  None apparent.    SPECIMENS:  None.    DISPOSITION:  The patient was awakened from anesthesia and brought to the recovery room in stable condition having tolerated procedure without apparent complication.      Needle, sponge, and instrument counts were correct at the end the procedure.  Preprocedure antibiotic and DVT prophylaxis administered per protocol.  Time-out was performed prior to initiating the procedure identifying the correct patient, procedure, and surgeon.    FINDINGS:  The patient has 3 unique new internal hemorrhoids without active bleeding.  No other lesions of the anorectal canal identified.  Successful rubber band ligation completed.    OPERATIVE TECHNIQUE:  The patient was brought to the operating room, and after induction of general endotracheal anesthesia, he was placed in the prone jackknife position.  Buttocks were taped and spread in the usual manner, and the perineum was prepped and draped in usual standard fashion.  Visual inspection of the perineum revealed no lesions.  Inspection of the anus and rectum revealed no evidence of fissure or other mucosal-based lesions.   Digital rectal examination revealed normal rectal tone, slightly diminished, but otherwise normal for age.  Prostate within normal limits.  Palpable internal hemorrhoids.    Next, anal retractor was placed.  Circumferential inspection of the anorectal canal confirmed multiple internal hemorrhoids in 3 distinct locations.  No other lesions visible in the anorectal canal within view of the anoscope.  Next, each hemorrhoid was separately grasped and ligated using an O ring ligator to place rubber band at the base of the internal hemorrhoid.  This was accomplished in 3 locations with adequate placement of the rubber band.  In the mucosa just lateral to the anterior midline, 1 area of abrasion was oversewn with a 3-0 chromic suture to control bleeding.   Circumferential inspection of the anorectal canal confirmed satisfactory placement of all 3 rubber bands, excellent hemostasis.  Retractor was withdrawn.  The patient was then awakened from anesthesia and brought to the recovery room in stable condition.  He tolerated the procedure without apparent complication.  Needle, sponge, and instrument counts were correct at the end of the procedure.  Operative findings discussed with the patient's family immediately upon conclusion of surgery.    Dictated By Glenroy Hoover M.D.  d: 03/19/2025 11:19:41  t: 03/19/2025 13:50:21  Job [id number]  PP/

## 2025-03-19 NOTE — ANESTHESIA POSTPROCEDURE EVALUATION
[facility]    Wong Carty Patient Status:  Hospital Outpatient Surgery   Age/Gender 35 year old male MRN [id number]   Location [facility] POST ANESTHESIA CARE UNIT Attending Glenroy Hoover MD   Hosp Day # 0 PCP Tino Gaston MD       Anesthesia Post-op Note    ANAL EXAMINATION UNDER ANESTHESIA AND RUBBER BAND LIGATION OF 3 LOCATIONS    Procedure Summary       Date: 03/19/25 Room / Location:  MAIN OR 04 /  MAIN OR    Anesthesia Start: 1035 Anesthesia Stop: 1130    Procedure: ANAL EXAMINATION UNDER ANESTHESIA AND RUBBER BAND LIGATION OF 3 LOCATIONS (Anus) Diagnosis:       Rectum bleeding      (Rectum bleeding [K62.5])    Surgeons: Glenroy Hoover MD Anesthesiologist: Jeb Sarah MD    Anesthesia Type: general ASA Status: 2            Anesthesia Type: general    Vitals Value Taken Time   /73 03/19/25 1144   Temp 97.5 °F (36.4 °C) 03/19/25 1129   Pulse 81 03/19/25 1152   Resp 13 03/19/25 1152   SpO2 96 % 03/19/25 1152   Vitals shown include unfiled device data.        Patient Location: PACU    Anesthesia Type: general    Airway Patency: patent    Postop Pain Control: adequate    Mental Status: preanesthetic baseline    Nausea/Vomiting: none    Cardiopulmonary/Hydration status: stable euvolemic    Complications: no apparent anesthesia related complications    Postop vital signs: stable    Dental Exam: Unchanged from Preop    Patient to be discharged from PACU when criteria met.

## 2025-03-19 NOTE — H&P
Expand All Collapse All    Follow Up Visit Note     Active Problems      1. Rectal bleeding          Chief Complaint        Chief Complaint   Patient presents with    [facility]       EP- Rectal Bleeding- states bleeding on and off, last time bleeding on 10/28            History of Present Illness     The patient presents with moderate volume recurrent painless rectal bleeding.  Recently the patient experienced an episode that was overall unprovoked this past Monday.  Patient's had prior rubber band ligations and sphincterotomy.  I proposed evaluation in the office with rubber band ligation.  The patient, however was intolerant of anoscopy therefore recommendation is for examination under anesthesia.     The patient denies fever, chills, chest pain, shortness of breath, dyspnea. The patient also denies hematemesis, melena. The patient denies change in bowel or bladder habits. There is no complaint of hematuria or dysuria.     I discussed the risk, benefits, alternatives of surgery.  I discussed the anticipated postoperative recovery including dietary, activity, exercise, and lifestyle recommendations.  The patient's questions regarding surgical procedure were answered and the patient voiced understanding of the care plan.     Allergies  Wong is allergic to eggs or egg-derived products.     Past Medical / Surgical / Social / Family History    The past medical and past surgical history have been reviewed by me today.     Past Medical History       Past Medical History:    Anxiety    COVID-19     fever, sweating, chills, body aches, sore throat, headache, loss of taste and smell    COVID-19     fever, chills, sore throat, loss of taste and smell    Depression    Hx of motion sickness    Migraines    Visual impairment     glasses         Past Surgical History         Past Surgical History:   Procedure Laterality Date    Removal of anal fissure   03/2022            The family history and social  history have been reviewed by me today.     Family History         Family History   Adopted: Yes   Problem Relation Age of Onset    Depression Mother      Anxiety Mother      Other (egg allergy) Maternal Grandfather           Social Hx on file   Social History            Socioeconomic History    Marital status:    Tobacco Use    Smoking status: Never       Passive exposure: Never    Smokeless tobacco: Never   Vaping Use    Vaping status: Never Used   Substance and Sexual Activity    Alcohol use: Yes       Comment: socially    Drug use: Never   Other Topics Concern    Caffeine Concern Yes       Comment: 1 tea 1 cup coffee daily    Exercise Yes       Comment: walks    Seat Belt Yes           Medications - Current      Current Outpatient Medications:     escitalopram 10 MG Oral Tab, Take 1 tablet (10 mg total) by mouth daily., Disp: 90 tablet, Rfl: 0    ALPRAZOLAM ER 0.5 MG Oral Tablet 24 Hr, TAKE 1 TABLET(0.5 MG) BY MOUTH EVERY MORNING, Disp: 30 tablet, Rfl: 0    levothyroxine 50 MCG Oral Tab, Take 1 tablet (50 mcg total) by mouth before breakfast., Disp: 90 tablet, Rfl: 1    erenumab-aooe (AIMOVIG) 70 MG/ML Subcutaneous, Inject 1 mL (70 mg total) into the skin every 30 (thirty) days., Disp: 1 mL, Rfl: 11    ubrogepant (UBRELVY) 50 MG Oral Tab, TAKE 1 TABLET BY MOUTH AT ONSET OF MIGRAINE. MAY TAKE ADDITIONAL TABLET IN 2 HOURS AS NEEDED. DO NOT EXCEED 2 TABLETS PER 24 HOUR PERIOD (Patient not taking: Reported on 9/17/2024), Disp: 16 tablet, Rfl: 5    senna-docusate 8.6-50 MG Oral Tab, Take 1 tablet by mouth daily., Disp: 30 tablet, Rfl: 0    aspirin-acetaminophen-caffeine 250-250-65 MG Oral Tab, Take 1 tablet by mouth every 6 (six) hours as needed for Pain., Disp: , Rfl:     acetaminophen 500 MG Oral Tab, Take 2 tablets (1,000 mg total) by mouth every 6 (six) hours as needed for Pain., Disp: , Rfl:     docusate sodium 100 MG Oral Tab, Take 1 tablet (100 mg total) by mouth daily., Disp: , Rfl:         Review of  Systems  The Review of Systems has been reviewed by me during today.  Review of Systems   Constitutional:  Negative for chills, diaphoresis, fatigue, fever and unexpected weight change.   HENT:  Negative for hearing loss, nosebleeds, sore throat and trouble swallowing.    Respiratory:  Negative for apnea, cough, shortness of breath and wheezing.    Cardiovascular:  Negative for chest pain, palpitations and leg swelling.   Gastrointestinal:  Negative for abdominal distention, abdominal pain, anal bleeding, blood in stool, constipation, diarrhea, nausea and vomiting.   Genitourinary:  Negative for difficulty urinating, dysuria, frequency and urgency.   Musculoskeletal:  Negative for arthralgias and myalgias.   Skin:  Negative for color change and rash.   Neurological:  Negative for tremors, syncope and weakness.   Hematological:  Negative for adenopathy. Does not bruise/bleed easily.   Psychiatric/Behavioral:  Negative for behavioral problems and sleep disturbance.          Physical Findings   /79   Pulse 80   Temp 97.9 °F (36.6 °C) (Temporal)   Resp 20   SpO2 97%   Physical Exam  Exam conducted with a chaperone present.   Constitutional:       Appearance: He is well-developed.   HENT:      Head: Normocephalic and atraumatic.   Eyes:      General: No scleral icterus.  Neck:      Trachea: No tracheal deviation.   Genitourinary:     Rectum: No mass, tenderness, anal fissure or external hemorrhoid. Normal anal tone.      Comments:   Anal Sphincter Intact  No Pruritis Ani  No Lichenification  No Abscess  No Fistula in ano  No Anterior Fissure  No Posterior Fissure     See Procedures:  Anoscopy attempted but discontinued due to significant patient discomfort and intolerance.     Skin:     General: Skin is warm and dry.   Neurological:      Mental Status: He is alert and oriented to person, place, and time.   Psychiatric:         Speech: Speech normal.         Behavior: Behavior normal. Behavior is cooperative.          Thought Content: Thought content normal.         Judgment: Judgment normal.             Assessment   1. Rectal bleeding          Plan      The patient will be scheduled for anorectal examination under anesthesia and rubber band ligation of internal hemorrhoids.     The huy-operative care plan was discussed with the patient, who voices understanding.  Activity and lifting recommendations were discussed in length.      The risks, benefits, and alternatives to the procedure were explained to the patient.  The risks explained include, but are not limited to, bleeding, infection, pain wound complications, recurrence, incorrect diagnosis, injury to adjacent organs and structures. We also discussed the possibile need for further therapeutic, diagnostic, or surgical intervention.  The patient voiced understanding, and after all questions were answered to the patient's satisfaction, the patient provided willing and informed consent to proceed.       Glenroy Hoover MD FACS  Trauma Medical Director, [facility]     The 21st Century Cures Act makes medical notes like these available to patients in the interest of transparency. Please be advised this is a medical document. Medical documents are intended to carry relevant information, facts as evident, and the clinical opinion of the practitioner. The medical note is intended as peer to peer communication and may appear blunt or direct. It is written in medical language and may contain abbreviations or verbiage that are unfamiliar.     This note was prepared using Dragon Medical voice recognition dictation software. As a result, errors may occur. When identified, these errors have been corrected. While every attempt is made to correct errors during dictation, discrepancies may still exist.

## 2025-03-19 NOTE — ANESTHESIA PREPROCEDURE EVALUATION
PRE-OP EVALUATION    Patient Name: Wong Carty    Admit Diagnosis: Rectum bleeding [K62.5]    Pre-op Diagnosis: Rectum bleeding [K62.5]    ANAL EXAMINATION UNDER ANESTHESIA AND RUBBER BAND LIGATION OF HEMORRHOIDS    Anesthesia Procedure: ANAL EXAMINATION UNDER ANESTHESIA AND RUBBER BAND LIGATION OF HEMORRHOIDS    Surgeons and Role:     * Glenroy Hoover MD - Primary    Pre-op vitals reviewed.        Body mass index is 27.41 kg/m².    Current medications reviewed.  Hospital Medications:   [Transfer Hold] acetaminophen (Tylenol Extra Strength) tab 1,000 mg  1,000 mg Oral Once    scopolamine (Transderm-Scop) 1 MG/3DAYS patch 1 patch  1 patch Transdermal Once    lactated ringers infusion   Intravenous Continuous    [COMPLETED] heparin (Porcine) 5000 UNIT/ML injection 5,000 Units  5,000 Units Subcutaneous Once    ceFAZolin (Ancef) 2g in 10mL IV syringe premix  2 g Intravenous Once    And    metroNIDAZOLE in sodium chloride 0.79% (Flagyl) 5 mg/mL IVPB premix 500 mg  500 mg Intravenous Once    metroNIDAZOLE in sodium chloride 0.79% (Flagyl) 5 mg/mL IVPB premix        ceFAZolin (Ancef) 2 g/10mL IV syringe premix           Outpatient Medications:   Prescriptions Prior to Admission[1]    Allergies: Eggs or egg-derived products      Anesthesia Evaluation        Anesthetic Complications           GI/Hepatic/Renal      (+) GERD and well controlled      (-) chronic renal disease   (-) liver disease                 Cardiovascular            MET: >4    (-) obesity  (-) hypertension     (-) CAD  (-) past MI  (-) CABG/stent  (-) pacemaker/AICD  (-) valvular problems/murmurs     (-) dysrhythmias   (-) CHF  (-) angina              Endo/Other      (-) diabetes                            Pulmonary      (-) asthma  (-) COPD                   Neuro/Psych          (-) CVA     (-) seizures                       Past Surgical History:   Procedure Laterality Date    Colonoscopy      Removal of anal fissure  03/2022     Social History      Socioeconomic History    Marital status:    Tobacco Use    Smoking status: Never     Passive exposure: Never    Smokeless tobacco: Never   Vaping Use    Vaping status: Never Used   Substance and Sexual Activity    Alcohol use: Not Currently     Comment: pt denies drinking    Drug use: Never   Other Topics Concern    Caffeine Concern Yes    Stress Concern Yes    Weight Concern Yes    Special Diet No    Exercise No    Seat Belt Yes     History   Drug Use Unknown     Available pre-op labs reviewed.  Lab Results   Component Value Date    WBC 5.6 03/03/2025    RBC 4.82 03/03/2025    HGB 14.4 03/03/2025    HCT 42.6 03/03/2025    MCV 88.4 03/03/2025    MCH 29.9 03/03/2025    MCHC 33.8 03/03/2025    RDW 12.8 03/03/2025     03/03/2025     Lab Results   Component Value Date     03/03/2025    K 4.3 03/03/2025    CL 99 03/03/2025    CO2 31 03/03/2025    BUN 11 03/03/2025    CREATSERUM 1.08 03/03/2025    GLU 87 03/03/2025    CA 9.5 03/03/2025            Airway      Mallampati: II  Mouth opening: 3 FB  TM distance: 4 - 6 cm  Neck ROM: full Cardiovascular    Cardiovascular exam normal.         Dental    Dentition appears grossly intact         Pulmonary    Pulmonary exam normal.                 Other findings              ASA: 2   Plan: general  NPO status verified and           Plan/risks discussed with: patient                Present on Admission:  **None**             [1]   Medications Prior to Admission   Medication Sig Dispense Refill Last Dose/Taking    prazosin 1 MG Oral Cap Take 1 capsule (1 mg total) by mouth nightly. 30 capsule 0 Past Week    AIMOVIG 70 MG/ML Subcutaneous ADMINISTER 1 ML UNDER THE SKIN 1 TIME A MONTH 1 mL 3 Past Month    UBRELVY 50 MG Oral Tab TAKE 1 TABLET BY MOUTH AT ONSET OF MIGRAINE. MAY TAKE ADDITIONAL TABLET IN 2 HOURS AS NEEDED. DO NOT EXCEED 2 TABLETS PER 24 HOUR PERIOD 16 tablet 3 Past Month    LITHIUM  MG Oral Tab CR TAKE 1 TABLET(300 MG) BY MOUTH AT BEDTIME 30  tablet 0 3/18/2025 Bedtime    desonide 0.05 % External Cream APPLY TO THE AFFECTED AREA FACE EVERY DAY MONDAY TO FRIDAY   Taking    ketoconazole 2 % External Shampoo    Taking    azelastine 0.1 % Nasal Solution 1 spray by Nasal route 2 (two) times daily. 30 mL 0 Taking    ergocalciferol 1.25 MG (95194 UT) Oral Cap Take 1 capsule (50,000 Units total) by mouth once a week. 8 capsule 0 Past Week    ALPRAZolam 0.25 MG Oral Tab Take 1 tablet (0.25 mg total) by mouth in the morning, at noon, and at bedtime. (Patient taking differently: Take 1 tablet (0.25 mg total) by mouth in the morning, at noon, and at bedtime. Not always taking morning and noon doses) 45 tablet 0 3/18/2025 Bedtime    levothyroxine 50 MCG Oral Tab Take 2 tablets (100 mcg total) by mouth before breakfast.   Past Week    melatonin 5 MG Oral Tab Take 1 tablet (5 mg total) by mouth at bedtime.   Past Week    escitalopram 10 MG Oral Tab    More than a month    omeprazole 20 MG Oral Capsule Delayed Release Take 1 capsule (20 mg total) by mouth every morning before breakfast. 90 capsule 0 More than a month    QUEtiapine 100 MG Oral Tab Take 1 tablet (100 mg total) by mouth nightly. (Patient taking differently: Take 0.5 tablets (50 mg total) by mouth nightly.) 30 tablet 0 More than a month    traZODone 50 MG Oral Tab Take 1 tablet (50 mg total) by mouth nightly. 30 tablet 0 More than a month    docusate sodium 100 MG Oral Cap Take 100 mg by mouth 2 (two) times daily as needed for constipation. 60 capsule 0 More than a month

## 2025-03-19 NOTE — ANESTHESIA PROCEDURE NOTES
Airway  Date/Time: 3/19/2025 10:46 AM  Urgency: elective    Airway not difficult    General Information and Staff    Patient location during procedure: OR  Anesthesiologist: Jeb Sarah MD  Performed: anesthesiologist   Performed by: Jeb Sarah MD  Authorized by: Jeb Sarah MD      Indications and Patient Condition  Indications for airway management: anesthesia  Sedation level: deep  Preoxygenated: yes  Patient position: sniffing  Mask difficulty assessment: 1 - vent by mask    Final Airway Details  Final airway type: endotracheal airway      Successful airway: ETT  Cuffed: yes   Successful intubation technique: direct laryngoscopy  Facilitating devices/methods: intubating stylet  Endotracheal tube insertion site: oral  Blade: Carnes  Blade size: #2  ETT size (mm): 8.0    Cormack-Lehane Classification: grade I - full view of glottis  Placement verified by: capnometry   Cuff volume (mL): 6  Measured from: lips  Number of attempts at approach: 1  Number of other approaches attempted: 0

## 2025-03-19 NOTE — DISCHARGE INSTRUCTIONS
Home Care Instructions  Rubber Band Ligation of Hemorrhoids  Dr Sneha MD    MEDICATIONS  You will be given a prescription for pain. Take 1-2 tablets every 4-6 hours as needed. Pain medications will ease your pain, but you should expect some incisional pain for about 2-5 days. You should walk often, cough and take deep breathes.    DIET  You will begin a high fiber diet. If your surgeon has not outlined the high fiber diet, he will provide an instruction sheet on that topic. The instruction sheet will also recommend a fiber supplement such as Metamucil, Konsyl, or Citrucel. Take the fiber supplement as instructed twice daily.    WOUND CARE You will perform sitz baths two times a day for the first two days only. Sitz baths simply mean soaking your anus in a tub of warm-hot water for about 15 minutes. Sitz baths will clean the anal wound as well as relax the anal sphincter muscles, which will help minimize your pain. Be careful around the anal wound, especially if you have stitches on the outside. Gently pat your anus dry (never rub) or simply dry your anus with a blow-dryer set to cool/warm. Do not soak your anus beyond 15 minutes or perform sitz baths beyond the first two days. Otherwise you run the risk of melting your stitches. After the first two postoperative days, you may shower twice a day for up to 15 minutes.    ACTIVITY  After surgery, your driving reflexes will be slower, especially if you are taking pain medications. Therefore, you are restricted from driving until after your follow-up visit. You may walk about the house, go shopping, see a movie, or eat at a restaurant. You may also climb stairs, but no weight lifting, power-walking, jogging, or using the “Stair-Master”.    APPOINTMENT  Please call our office for an appointment in 3 weeks after surgery, unless otherwise instructed. This will allow ample time for the swelling and soreness to resolve before your wound is examined. There may be some  blood mixed with your stools. This is normal. If you begin passing stools with mostly blood, go to [facility]. If you have fevers, chills, or if you are concerned about your wound, please call us immediately at [phone].      Thank you for entrusting us with your care.  [facility]     You received a drug called Toradol which is an Anti Inflammatory at: 11:15am  If you are allowed to take Anti inflammatories:    Do not take any Anti Inflammatory like Motrin, Aleve or Ibuprofen until after: 5:15pm  Please report any suspected allergic reactions or bleeding issues to your doctor

## 2025-03-19 NOTE — BRIEF OP NOTE
Pre-Operative Diagnosis: Rectum bleeding [K62.5]     Post-Operative Diagnosis: Rectum bleeding [K62.5]      Procedure Performed:   ANAL EXAMINATION UNDER ANESTHESIA AND RUBBER BAND LIGATION OF 3 LOCATIONS    Surgeons and Role:     * Glenroy Hoover MD - Primary    Assistant(s):  PA: Joann Kurtz PA     Surgical Findings: internal hemorrhoids     Specimen: none     Estimated Blood Loss: Blood Output: 2 mL (3/19/2025 11:05 AM)      Dictation Number:  [id number]    Glenroy Hoover MD  3/19/2025  11:16 AM

## 2025-03-24 ENCOUNTER — TELEPHONE (OUTPATIENT)
Facility: LOCATION | Age: 35
End: 2025-03-24

## 2025-03-24 NOTE — TELEPHONE ENCOUNTER
Spoke with patient.  Patient was not prescribed any narcotics and is not taking any.  Patient advised according to his discharge instruction, do not drive within 8 hours of taking narcotics.  Patient advised letter for employer will be provided at  his request.

## 2025-03-24 NOTE — TELEPHONE ENCOUNTER
Patient called because he was told no driving until his post op. He wants to know if he has to follow that. He also wants a letter stating that he had surgery and is under the care of Dr. Hoover     Please advise   # [phone]

## 2025-03-26 ENCOUNTER — OFFICE VISIT (OUTPATIENT)
Dept: FAMILY MEDICINE CLINIC | Facility: CLINIC | Age: 35
End: 2025-03-26
Payer: COMMERCIAL

## 2025-03-26 ENCOUNTER — TELEPHONE (OUTPATIENT)
Facility: LOCATION | Age: 35
End: 2025-03-26

## 2025-03-26 VITALS
TEMPERATURE: 100 F | DIASTOLIC BLOOD PRESSURE: 64 MMHG | HEIGHT: 67 IN | HEART RATE: 102 BPM | SYSTOLIC BLOOD PRESSURE: 110 MMHG | OXYGEN SATURATION: 98 % | RESPIRATION RATE: 18 BRPM | WEIGHT: 171 LBS | BODY MASS INDEX: 26.84 KG/M2

## 2025-03-26 DIAGNOSIS — J06.9 UPPER RESPIRATORY TRACT INFECTION, UNSPECIFIED TYPE: Primary | ICD-10-CM

## 2025-03-26 DIAGNOSIS — R05.2 SUBACUTE COUGH: ICD-10-CM

## 2025-03-26 DIAGNOSIS — E03.9 HYPOTHYROIDISM, UNSPECIFIED TYPE: ICD-10-CM

## 2025-03-26 DIAGNOSIS — K21.9 GASTROESOPHAGEAL REFLUX DISEASE, UNSPECIFIED WHETHER ESOPHAGITIS PRESENT: ICD-10-CM

## 2025-03-26 LAB
CHOL/HDLC RATIO: 5 (CALC)
CHOLESTEROL, TOTAL: 203 MG/DL
HDL CHOLESTEROL: 41 MG/DL
LDL-CHOLESTEROL: 110 MG/DL (CALC)
NON-HDL CHOLESTEROL: 162 MG/DL (CALC)
T4, FREE: 1 NG/DL (ref 0.8–1.8)
TESTOSTERONE,TOTAL,MALES: 350 NG/DL (ref 250–827)
TRIGLYCERIDES: 365 MG/DL
TSH W/REFLEX TO FT4: 19.09 MIU/L (ref 0.4–4.5)
VITAMIN D, 25-OH, TOTAL: 39 NG/ML (ref 30–100)

## 2025-03-26 PROCEDURE — 99214 OFFICE O/P EST MOD 30 MIN: CPT | Performed by: NURSE PRACTITIONER

## 2025-03-26 PROCEDURE — 3008F BODY MASS INDEX DOCD: CPT | Performed by: NURSE PRACTITIONER

## 2025-03-26 PROCEDURE — 3078F DIAST BP <80 MM HG: CPT | Performed by: NURSE PRACTITIONER

## 2025-03-26 PROCEDURE — 3074F SYST BP LT 130 MM HG: CPT | Performed by: NURSE PRACTITIONER

## 2025-03-26 RX ORDER — ONDANSETRON 4 MG/1
4 TABLET, ORALLY DISINTEGRATING ORAL EVERY 8 HOURS PRN
Qty: 30 TABLET | Refills: 0 | Status: SHIPPED | OUTPATIENT
Start: 2025-03-26

## 2025-03-26 RX ORDER — LEVOTHYROXINE SODIUM 75 UG/1
75 TABLET ORAL
Qty: 90 TABLET | Refills: 0 | Status: SHIPPED | OUTPATIENT
Start: 2025-03-26

## 2025-03-26 NOTE — PATIENT INSTRUCTIONS
Gargle with warm salt water solution 3-5 times daily. Dissolve 1/2 teaspoon salt in half cup of warm tap water. Gargle and spit.     Try a premixed saline nasal spray, available over the counter, such as [place] Nasal Spray (or generic equivalent), 2-4 times daily. Do one spray each nostril and gently blow nose after. (You should discard this once you are feeling better and use a new bottle for any future illnesses)    Get plenty of rest and drink extra fluids.      Do flu test tomorrow and if positive notify office for prescription.     Repeat TSH level on or after 5/26/25.

## 2025-03-26 NOTE — TELEPHONE ENCOUNTER
The patient recently called stating that they are having some flu symptoms and will go to urgent care in the morning before their upcoming appointment tomorrow. The patient says that if urgent care says he need to reschedule he will.    Call back # [phone]

## 2025-03-26 NOTE — PROGRESS NOTES
Chief Complaint   Patient presents with    Medication Follow-Up     Omeprazole, still has cough but not as bad as before     Lab     F/u     Nausea     Started feeling nausea this morning, has some body aches/ chills        HPI:  Presents with approx 1 day history of body aches with some chills, mild nausea this morning. Noted with low grade fever in office today. Reports PND, sore throat, ear pressure w/o pain and some mild sinus congestion. Denies abd pains, emesis diarrhea, constipation, SOB/PACHECO. Has not been treating with anything.     Also, for follow up of visit with me on 3/3, during which we started omeprazole for cough and as well had complaints of fatigue. In office today reports cough is much improved but not completely resolved. Did have adjustment of levothyroxine. Repeated labs for TSH too soon, although there was some improvement in TSH level. Reports he is taking levothyroxine 50mcg daily at this time and has been for some time.       Past Medical History:    Allergic rhinitis    Anesthesia complication    takes longer to wake up-remains groggy long    Anxiety    Back problem    COVID-19    fever, sweating, chills, body aches, sore throat, headache, loss of taste and smell    COVID-19    fever, chills, sore throat, loss of taste and smell    Depression    Esophageal reflux    Hx of motion sickness    Migraines    Visual impairment    glasses       Patient Active Problem List   Diagnosis    Attention deficit hyperactivity disorder (ADHD), predominantly inattentive type    Anal fissure    Rectal bleeding    Vitamin D deficiency    Incontinence    Gastroesophageal reflux disease    Major depressive disorder, recurrent episode, moderate (HCC)    Bleeding internal hemorrhoids    Borderline personality disorder (HCC)    MELODY (generalized anxiety disorder)    Severe recurrent major depression without psychotic features (HCC)       Current Outpatient Medications   Medication Sig Dispense Refill    ondansetron  4 MG Oral Tablet Dispersible Take 1 tablet (4 mg total) by mouth every 8 (eight) hours as needed. 30 tablet 0    prazosin 1 MG Oral Cap Take 1 capsule (1 mg total) by mouth nightly. 30 capsule 0    AIMOVIG 70 MG/ML Subcutaneous ADMINISTER 1 ML UNDER THE SKIN 1 TIME A MONTH 1 mL 3    UBRELVY 50 MG Oral Tab TAKE 1 TABLET BY MOUTH AT ONSET OF MIGRAINE. MAY TAKE ADDITIONAL TABLET IN 2 HOURS AS NEEDED. DO NOT EXCEED 2 TABLETS PER 24 HOUR PERIOD 16 tablet 3    LITHIUM  MG Oral Tab CR TAKE 1 TABLET(300 MG) BY MOUTH AT BEDTIME 30 tablet 0    escitalopram 10 MG Oral Tab       desonide 0.05 % External Cream APPLY TO THE AFFECTED AREA FACE EVERY DAY MONDAY TO FRIDAY      ketoconazole 2 % External Shampoo       omeprazole 20 MG Oral Capsule Delayed Release Take 1 capsule (20 mg total) by mouth every morning before breakfast. 90 capsule 0    azelastine 0.1 % Nasal Solution 1 spray by Nasal route 2 (two) times daily. 30 mL 0    ergocalciferol 1.25 MG (21319 UT) Oral Cap Take 1 capsule (50,000 Units total) by mouth once a week. 8 capsule 0    ALPRAZolam 0.25 MG Oral Tab Take 1 tablet (0.25 mg total) by mouth in the morning, at noon, and at bedtime. (Patient taking differently: Take 1 tablet (0.25 mg total) by mouth in the morning, at noon, and at bedtime. Not always taking morning and noon doses) 45 tablet 0    QUEtiapine 100 MG Oral Tab Take 1 tablet (100 mg total) by mouth nightly. (Patient taking differently: Take 0.5 tablets (50 mg total) by mouth nightly.) 30 tablet 0    traZODone 50 MG Oral Tab Take 1 tablet (50 mg total) by mouth nightly. 30 tablet 0    docusate sodium 100 MG Oral Cap Take 100 mg by mouth 2 (two) times daily as needed for constipation. 60 capsule 0    levothyroxine 50 MCG Oral Tab Take 2 tablets (100 mcg total) by mouth before breakfast.      melatonin 5 MG Oral Tab Take 1 tablet (5 mg total) by mouth at bedtime.         Physical Exam  /64   Pulse 102   Temp 100 °F (37.8 °C)   Resp 18   Ht  5' 7\" (1.702 m)   Wt 171 lb (77.6 kg)   SpO2 98%   BMI 26.78 kg/m²   Constitutional: well developed, well nourished, in no apparent distress  HEENT: Normocephalic and atraumatic. Tympanic membranes clear with bulging bilat, no erythema or air/fluid levels. Oropharynx is erythematous without exudate, lesions or edema. (+)PND. No facial tenderness.  Eyes: Conjunctivae are pink and moist without drainage.   Lymphadenopathy: No cervical adenopathy.   Cardiovascular: Normal rate, regular rhythm.  No murmur.   Pulmonary/Chest: No respiratory distress. Effort normal. Breath sounds clear bilaterally. No wheezes, rhonchi or rales appreciated. No cough heard during exam.   Abd: soft, non-distended, non-TTP, w/o guarding, rebound, masses or appreciable organomegaly. BS(+)x4- normoactive.  Skin: Skin is warm and dry. No pallor. No rash noted.    A/P:    Encounter Diagnoses   Name Primary?    Upper respiratory tract infection, unspecified type- suspect influenza. Instructed to do home influenza test tomorrow and if positive, notify office. Will send Tamiflu. Verbalized understanding of instructions and agreeable to this plan of care. For now ondansetron PRN. Medication administration, use and side effects discussed.    Yes    Subacute cough- continue omeprazole for total of 60 days. Notify office if cough does not continue to improve.        Gastroesophageal reflux disease, unspecified whether esophagitis present- continue omeprazole as above.        Hypothyroidism, unspecified type- levothyroxine 75mcg daily. Repeat TSH in 8 weeks from visit today. Verbalized understanding of instructions and agreeable to this plan of care.           Orders Placed This Encounter   Procedures    TSH W REFLEX TO FREE T4 [26048][Q]       Meds & Refills for this Visit:  Requested Prescriptions     Signed Prescriptions Disp Refills    ondansetron 4 MG Oral Tablet Dispersible 30 tablet 0     Sig: Take 1 tablet (4 mg total) by mouth every 8 (eight)  hours as needed.       Imaging & Consults:  None    No follow-ups on file.  Patient Instructions                     Gargle with warm salt water solution 3-5 times daily. Dissolve 1/2 teaspoon salt in half cup of warm tap water. Gargle and spit.     Try a premixed saline nasal spray, available over the counter, such as [place] Nasal Spray (or generic equivalent), 2-4 times daily. Do one spray each nostril and gently blow nose after. (You should discard this once you are feeling better and use a new bottle for any future illnesses)    Get plenty of rest and drink extra fluids.      Do flu test tomorrow and if positive notify office for prescription.     Repeat TSH level on or after 5/5/25.     All questions were answered and the patient understands the plan.

## 2025-04-03 ENCOUNTER — OFFICE VISIT (OUTPATIENT)
Facility: LOCATION | Age: 35
End: 2025-04-03
Payer: COMMERCIAL

## 2025-04-03 VITALS
OXYGEN SATURATION: 99 % | TEMPERATURE: 97 F | SYSTOLIC BLOOD PRESSURE: 109 MMHG | DIASTOLIC BLOOD PRESSURE: 71 MMHG | HEART RATE: 79 BPM

## 2025-04-03 DIAGNOSIS — Z98.890 POST-OPERATIVE STATE: Primary | ICD-10-CM

## 2025-04-03 NOTE — PROGRESS NOTES
Post Operative Visit Note       Active Problems  1. Post-operative state         Chief Complaint   Chief Complaint   Patient presents with    Post-Op     PO - 3/19 w/PBP, ANAL EXAMINATION UNDER ANESTHESIA AND RUBBER BAND LIGATION OF 3 LOCATIONS , pt reports slight bleeding with bowel movement, no other symptoms.          History of Present Illness   The patient presents today for postoperative visit following anal exam under anesthesia with rubber band ligation x 3 on 3/19/2025 with Dr. Hoover.    Patient is doing well overall postoperatively.  He endorsed rectal bleeding with bowel movements during the immediate postoperative period, but states this has decreased in amount and frequency.  He endorses dry dressing changes daily as needed. He denies perirectal pain.  He denies constipation and diarrhea. He denies fever and chills.    Allergies  Wong is allergic to eggs or egg-derived products.    Past Medical / Surgical / Social / Family History    The past medical and past surgical history have been reviewed by me today.     Past Medical History:    Allergic rhinitis    Anesthesia complication    takes longer to wake up-remains groggy long    Anxiety    Back problem    COVID-19    fever, sweating, chills, body aches, sore throat, headache, loss of taste and smell    COVID-19    fever, chills, sore throat, loss of taste and smell    Depression    Esophageal reflux    Hx of motion sickness    Migraines    Visual impairment    glasses     Past Surgical History:   Procedure Laterality Date    Colonoscopy      Removal of anal fissure  03/2022       The family history and social history have been reviewed by me today.    Family History   Adopted: Yes   Problem Relation Age of Onset    Depression Mother     Anxiety Mother     Other (egg allergy) Maternal Grandfather      Social History     Socioeconomic History    Marital status:    Tobacco Use    Smoking status: Never     Passive exposure: Never    Smokeless  tobacco: Never   Vaping Use    Vaping status: Never Used   Substance and Sexual Activity    Alcohol use: Not Currently     Comment: pt denies drinking    Drug use: Never   Other Topics Concern    Caffeine Concern Yes    Stress Concern Yes    Weight Concern Yes    Special Diet No    Exercise No    Seat Belt Yes        Current Outpatient Medications:     ondansetron 4 MG Oral Tablet Dispersible, Take 1 tablet (4 mg total) by mouth every 8 (eight) hours as needed., Disp: 30 tablet, Rfl: 0    levothyroxine 75 MCG Oral Tab, Take 1 tablet (75 mcg total) by mouth before breakfast., Disp: 90 tablet, Rfl: 0    prazosin 1 MG Oral Cap, Take 1 capsule (1 mg total) by mouth nightly., Disp: 30 capsule, Rfl: 0    AIMOVIG 70 MG/ML Subcutaneous, ADMINISTER 1 ML UNDER THE SKIN 1 TIME A MONTH, Disp: 1 mL, Rfl: 3    UBRELVY 50 MG Oral Tab, TAKE 1 TABLET BY MOUTH AT ONSET OF MIGRAINE. MAY TAKE ADDITIONAL TABLET IN 2 HOURS AS NEEDED. DO NOT EXCEED 2 TABLETS PER 24 HOUR PERIOD, Disp: 16 tablet, Rfl: 3    escitalopram 10 MG Oral Tab, , Disp: , Rfl:     desonide 0.05 % External Cream, APPLY TO THE AFFECTED AREA FACE EVERY DAY MONDAY TO FRIDAY, Disp: , Rfl:     ketoconazole 2 % External Shampoo, , Disp: , Rfl:     omeprazole 20 MG Oral Capsule Delayed Release, Take 1 capsule (20 mg total) by mouth every morning before breakfast., Disp: 90 capsule, Rfl: 0    azelastine 0.1 % Nasal Solution, 1 spray by Nasal route 2 (two) times daily., Disp: 30 mL, Rfl: 0    ALPRAZolam 0.25 MG Oral Tab, Take 1 tablet (0.25 mg total) by mouth in the morning, at noon, and at bedtime. (Patient taking differently: Take 1 tablet (0.25 mg total) by mouth in the morning, at noon, and at bedtime. Not always taking morning and noon doses), Disp: 45 tablet, Rfl: 0    QUEtiapine 100 MG Oral Tab, Take 1 tablet (100 mg total) by mouth nightly. (Patient taking differently: Take 0.5 tablets (50 mg total) by mouth nightly.), Disp: 30 tablet, Rfl: 0    traZODone 50 MG Oral  Tab, Take 1 tablet (50 mg total) by mouth nightly., Disp: 30 tablet, Rfl: 0    docusate sodium 100 MG Oral Cap, Take 100 mg by mouth 2 (two) times daily as needed for constipation., Disp: 60 capsule, Rfl: 0    melatonin 5 MG Oral Tab, Take 1 tablet (5 mg total) by mouth at bedtime., Disp: , Rfl:     LITHIUM  MG Oral Tab CR, TAKE 1 TABLET(300 MG) BY MOUTH AT BEDTIME, Disp: 30 tablet, Rfl: 0    PRAZOSIN 2 MG Oral Cap, TAKE 1 CAPSULE(2 MG) BY MOUTH EVERY NIGHT, Disp: 30 capsule, Rfl: 0    ERGOCALCIFEROL 1.25 MG (61199 UT) Oral Cap, TAKE 1 CAPSULE BY MOUTH 1 TIME A WEEK, Disp: 8 capsule, Rfl: 0      Review of Systems  A 10 point Review of Systems has been completed by me today and is negative except as above in the HPI.    Physical Findings   /71 (BP Location: Right arm, Patient Position: Sitting, Cuff Size: adult)   Pulse 79   Temp 97.2 °F (36.2 °C) (Temporal)   SpO2 99%   Gen/psych: alert and oriented, cooperative, no apparent distress  Cardiovascular: regular rate  Respiratory: respirations unlabored, no wheeze  Perianal: Exam limited to external exam only.  Medical chaperone present for the entirety of the exam. No evidence of external hemorrhoids present.  No erythema, tenderness, or drainage noted.  No active bleeding present.         Assessment/Plan  1. Post-operative state        Patient is doing well overall postoperatively.  Tylenol and ibuprofen for pain as needed.  Continue dry dressing changes daily or as needed for saturation.  Continue to monitor bleeding with bowel movements.  Advised patient to notify the clinic if he develops increased volume or frequency of rectal bleeding.  Stool softener as needed to avoid straining with bowel movements.  Advised patient that he should avoid activities that require him to strain such as heavy lifting.  Return to work note was written for patient at this time.  All questions and concerns were addressed and answered at this time.  Follow-up in 1 month  with Dr. Hoover, sooner if needed.        No orders of the defined types were placed in this encounter.       Imaging & Referrals   None    Follow Up  5/1/2025 with Dr. Hoover, sooner if needed.    OLIVER Gates  [facility] General Surgery  4/3/2025  1:31 PM

## 2025-05-17 ENCOUNTER — WALK IN (OUTPATIENT)
Dept: URGENT CARE | Age: 35
End: 2025-05-17

## 2025-05-17 VITALS
SYSTOLIC BLOOD PRESSURE: 108 MMHG | HEART RATE: 64 BPM | OXYGEN SATURATION: 99 % | TEMPERATURE: 96.9 F | RESPIRATION RATE: 16 BRPM | DIASTOLIC BLOOD PRESSURE: 68 MMHG

## 2025-05-17 DIAGNOSIS — H00.011 HORDEOLUM EXTERNUM OF RIGHT UPPER EYELID: Primary | ICD-10-CM

## 2025-05-17 RX ORDER — ERGOCALCIFEROL 1.25 MG/1
1.25 CAPSULE, LIQUID FILLED ORAL
COMMUNITY
Start: 2025-04-03

## 2025-05-17 RX ORDER — DESONIDE 0.5 MG/G
CREAM TOPICAL
COMMUNITY
Start: 2024-12-08

## 2025-05-17 RX ORDER — AZELASTINE 1 MG/ML
1 SPRAY, METERED NASAL
COMMUNITY
Start: 2025-03-03

## 2025-05-17 RX ORDER — LEVOTHYROXINE SODIUM 25 UG/1
25 TABLET ORAL
COMMUNITY
Start: 2025-03-03

## 2025-05-17 RX ORDER — KETOCONAZOLE 20 MG/ML
SHAMPOO, SUSPENSION TOPICAL
COMMUNITY
Start: 2024-12-08

## 2025-05-17 RX ORDER — LITHIUM CARBONATE 300 MG/1
300 TABLET, FILM COATED, EXTENDED RELEASE ORAL
COMMUNITY
Start: 2025-05-13

## 2025-05-17 RX ORDER — PRAZOSIN HYDROCHLORIDE 2 MG/1
CAPSULE ORAL
COMMUNITY
Start: 2025-04-16

## 2025-05-17 RX ORDER — TRAZODONE HYDROCHLORIDE 50 MG/1
TABLET ORAL
COMMUNITY
Start: 2025-03-02

## 2025-05-17 RX ORDER — PSEUDOEPHEDRINE HCL 30 MG
100 TABLET ORAL
COMMUNITY
Start: 2025-02-09

## 2025-05-17 RX ORDER — ESCITALOPRAM OXALATE 10 MG/1
TABLET ORAL
COMMUNITY
Start: 2025-02-10

## 2025-05-17 RX ORDER — LEVOTHYROXINE SODIUM 75 UG/1
75 TABLET ORAL
COMMUNITY
Start: 2025-03-26

## 2025-05-17 RX ORDER — POLYMYXIN B SULFATE AND TRIMETHOPRIM 1; 10000 MG/ML; [USP'U]/ML
SOLUTION OPHTHALMIC
Qty: 10 ML | Refills: 0 | Status: SHIPPED | OUTPATIENT
Start: 2025-05-17

## 2025-05-17 RX ORDER — LEVOTHYROXINE SODIUM 50 UG/1
TABLET ORAL
COMMUNITY
Start: 2025-02-01

## 2025-05-17 RX ORDER — QUETIAPINE FUMARATE 100 MG/1
TABLET, FILM COATED ORAL
COMMUNITY
Start: 2025-03-02

## 2025-05-17 RX ORDER — PRAZOSIN HYDROCHLORIDE 1 MG/1
1 CAPSULE ORAL
COMMUNITY
Start: 2025-05-13

## 2025-05-17 RX ORDER — UBROGEPANT 50 MG/1
TABLET ORAL
COMMUNITY

## 2025-05-17 RX ORDER — ONDANSETRON 4 MG/1
4 TABLET, ORALLY DISINTEGRATING ORAL
COMMUNITY
Start: 2025-03-26

## 2025-05-17 RX ORDER — ALPRAZOLAM 0.25 MG
0.25 TABLET ORAL
COMMUNITY
Start: 2025-05-13

## 2025-05-17 RX ORDER — OMEPRAZOLE 20 MG/1
20 CAPSULE, DELAYED RELEASE ORAL
COMMUNITY
Start: 2025-03-03

## 2025-05-27 RX ORDER — OMEPRAZOLE 20 MG/1
20 CAPSULE, DELAYED RELEASE ORAL
Qty: 90 CAPSULE | Refills: 0 | Status: SHIPPED | OUTPATIENT
Start: 2025-05-27

## 2025-05-27 NOTE — TELEPHONE ENCOUNTER
Refill request for:    Requested Prescriptions     Pending Prescriptions Disp Refills    OMEPRAZOLE 20 MG Oral Capsule Delayed Release [Pharmacy Med Name: OMEPRAZOLE 20MG CAPSULES] 90 capsule 0     Sig: TAKE 1 CAPSULE(20 MG) BY MOUTH EVERY MORNING BEFORE BREAKFAST        Gastrointestional Medication Protocol [id number]/2025 09:56 AM   Protocol Details Medication is active on med list    In person appointment or virtual visit in the past 12 mos or appointment in next 3 mos          Last Prescribed Quantity Refills   3/3/2025  90 0      LOV 3/26/2025     Patient was asked to follow-up in: Follow-up not documented in note    Appointment scheduled: Visit date not found    Medication not on protocol.     # 90 with 0 refills routed to LUPILLO Obrien for review

## 2025-07-07 RX ORDER — LEVOTHYROXINE SODIUM 75 UG/1
75 TABLET ORAL
Qty: 90 TABLET | Refills: 0 | Status: SHIPPED | OUTPATIENT
Start: 2025-07-07

## 2025-07-07 NOTE — TELEPHONE ENCOUNTER
Per 3/26/25 office visit with NP Bolivar Sanchez:  Hypothyroidism, unspecified type- levothyroxine 75mcg daily. Repeat TSH in 8 weeks from visit today. Verbalized understanding of instructions and agreeable to this plan of care.

## 2025-07-07 NOTE — TELEPHONE ENCOUNTER
90 day refill provided but needs to complete TSH level as ordered to ensure dosing is proper. Will need labs for future refills. Thanks.

## 2025-07-07 NOTE — TELEPHONE ENCOUNTER
For replies, please route to pool: [facility] CENTRAL REFILLS    Please review: medication fails/has no protocol attached.    No future appointments with primary care medicine     MyChart message sent, reminding patient of lab orders placed 3/26/25 and needing to be completed. Per office visit notes 3/26/25, patient to complete follow up thyroid labs in 8 weeks (around 5/26/25)

## 2025-08-21 ENCOUNTER — WALK IN (OUTPATIENT)
Dept: URGENT CARE | Age: 35
End: 2025-08-21

## 2025-08-21 ENCOUNTER — IMAGING SERVICES (OUTPATIENT)
Dept: GENERAL RADIOLOGY | Age: 35
End: 2025-08-21
Attending: FAMILY MEDICINE

## 2025-08-21 ENCOUNTER — MED INFO FORMS (OUTPATIENT)
Dept: HEALTH INFORMATION MANAGEMENT | Age: 35
End: 2025-08-21

## 2025-08-21 VITALS
HEART RATE: 82 BPM | DIASTOLIC BLOOD PRESSURE: 72 MMHG | OXYGEN SATURATION: 97 % | RESPIRATION RATE: 20 BRPM | TEMPERATURE: 96.7 F | SYSTOLIC BLOOD PRESSURE: 102 MMHG

## 2025-08-21 DIAGNOSIS — M54.2 CERVICALGIA: ICD-10-CM

## 2025-08-21 DIAGNOSIS — M54.9 ACUTE BILATERAL BACK PAIN, UNSPECIFIED BACK LOCATION: ICD-10-CM
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
## 2025-08-21 RX ORDER — KETOROLAC TROMETHAMINE 30 MG/ML
60 INJECTION, SOLUTION INTRAMUSCULAR; INTRAVENOUS ONCE
Status: COMPLETED | OUTPATIENT
Start: 2025-08-21 | End: 2025-08-21

## 2025-08-21 RX ORDER — PREDNISONE 20 MG/1
40 TABLET ORAL DAILY
Qty: 10 TABLET | Refills: 0 | Status: SHIPPED | OUTPATIENT
Start: 2025-08-21 | End: 2025-08-26

## 2025-08-21 RX ORDER — CYCLOBENZAPRINE HCL 10 MG
10 TABLET ORAL
Qty: 10 TABLET | Refills: 0 | Status: SHIPPED | OUTPATIENT
Start: 2025-08-21 | End: 2025-08-31

## 2025-08-21 RX ADMIN — KETOROLAC TROMETHAMINE 60 MG: 30 INJECTION, SOLUTION INTRAMUSCULAR; INTRAVENOUS at 11:05

## 2025-09-01 ENCOUNTER — TELEPHONE (OUTPATIENT)
Dept: URGENT CARE | Age: 35
End: 2025-09-01

## (undated) DEVICE — PACK CDS RECTAL

## (undated) DEVICE — SOLUTION IRRIG 1000ML 0.9% NACL USP BTL

## (undated) DEVICE — SUT CHRM GUT 3-0 27IN SH ABSRB UD 26MM 1/2

## (undated) DEVICE — GLOVE SUR 8 SENSICARE PI PIP CRM PWD F

## (undated) DEVICE — JELLY,LUBE,STERILE,FLIP TOP,TUBE,4-OZ: Brand: MEDLINE

## (undated) DEVICE — SLEEVE COMPR MD KNEE LEN SGL USE KENDALL SCD

## (undated) DEVICE — LIGASURE EXACT DISSECTOR: Brand: LIGASURE

## (undated) DEVICE — JELLY,LUBE,STERILE,FLIP TOP,TUBE,2-OZ: Brand: MEDLINE

## (undated) DEVICE — BAND HEMORRHOID LIGATOR

## (undated) DEVICE — UNDERPANTS MAT 2XL FOR 24-64IN WAIST PUR

## (undated) DEVICE — SYRINGE MED 10ML LL TIP W/O SFTY DISP

## (undated) DEVICE — GAUZE,SPONGE,4"X4",12PLY,STERILE,LF,2'S: Brand: MEDLINE

## (undated) NOTE — LETTER
24    Patient: Wong Carty  : 1990 Visit date: 8/15/2024    Dear  Tino Gaston MD    Thank you for referring Wong Carty to my practice.  Please find my assessment and plan below.    Assessment   1. Rectal bleeding        Plan     The area of concern on the perianal region has resolved.  There is no active bleeding, infection, fluid collection, hematoma, or seroma now.  Dietary, activity, exercise and lifestyle recommendations were discussed.  The patient will follow-up with me on an as-needed basis.  The patient was provided ample opportunity to ask questions.  All of the patient's questions were answered in detail.  The patient voiced understanding of the care plan.       Sincerely,       Glenroy Hoover MD   CC:   No Recipients

## (undated) NOTE — Clinical Note
4/3/2025    Return to ***    Name: Wong Carty        : 1990    To Whom It May Concern,    Wong Carty had surgery on *** and is:    {Return to School/Work:3327}    The patient may return to work on ***.    If there are any further questions, regarding this patient's care, please contact the patient directly.    Sincerely,    OLIVER Gates

## (undated) NOTE — LETTER
4/3/2025    Return to Work    Name: Wong Carty        : 1990    To Whom It May Concern,    Wong Carty had surgery on 3/19/2025.      The patient may return to work on 2025 without restrictions    .    If there are any further questions, regarding this patient's care, please contact the patient directly.    Sincerely,    OLIVER Gates

## (undated) NOTE — LETTER
10/30/24    Patient: Wong Carty  : 1990 Visit date: 10/30/2024    Dear  Tino Gaston MD    Thank you for referring Wong Garcia [id number]-[id number] to my practice.  Please find my assessment and plan below.          Sincerely,       Glenroy Hoover MD   CC: No Recipients